# Patient Record
Sex: FEMALE | Race: WHITE | Employment: OTHER | URBAN - METROPOLITAN AREA
[De-identification: names, ages, dates, MRNs, and addresses within clinical notes are randomized per-mention and may not be internally consistent; named-entity substitution may affect disease eponyms.]

---

## 2017-08-25 ENCOUNTER — OP HISTORICAL/CONVERTED ENCOUNTER (OUTPATIENT)
Dept: OTHER | Age: 62
End: 2017-08-25

## 2017-09-20 ENCOUNTER — OP HISTORICAL/CONVERTED ENCOUNTER (OUTPATIENT)
Dept: OTHER | Age: 62
End: 2017-09-20

## 2018-11-16 ENCOUNTER — OP HISTORICAL/CONVERTED ENCOUNTER (OUTPATIENT)
Dept: OTHER | Age: 63
End: 2018-11-16

## 2019-05-18 ENCOUNTER — ED HISTORICAL/CONVERTED ENCOUNTER (OUTPATIENT)
Dept: OTHER | Age: 64
End: 2019-05-18

## 2019-06-06 ENCOUNTER — IP HISTORICAL/CONVERTED ENCOUNTER (OUTPATIENT)
Dept: OTHER | Age: 64
End: 2019-06-06

## 2019-06-13 ENCOUNTER — ED HISTORICAL/CONVERTED ENCOUNTER (OUTPATIENT)
Dept: OTHER | Age: 64
End: 2019-06-13

## 2022-04-01 ENCOUNTER — APPOINTMENT (OUTPATIENT)
Dept: MRI IMAGING | Age: 67
DRG: 069 | End: 2022-04-01
Attending: EMERGENCY MEDICINE
Payer: MEDICARE

## 2022-04-01 ENCOUNTER — APPOINTMENT (OUTPATIENT)
Dept: GENERAL RADIOLOGY | Age: 67
DRG: 069 | End: 2022-04-01
Attending: NURSE PRACTITIONER
Payer: MEDICARE

## 2022-04-01 ENCOUNTER — HOSPITAL ENCOUNTER (INPATIENT)
Age: 67
LOS: 3 days | Discharge: HOME | DRG: 069 | End: 2022-04-04
Attending: EMERGENCY MEDICINE | Admitting: INTERNAL MEDICINE
Payer: MEDICARE

## 2022-04-01 ENCOUNTER — APPOINTMENT (OUTPATIENT)
Dept: CT IMAGING | Age: 67
DRG: 069 | End: 2022-04-01
Attending: EMERGENCY MEDICINE
Payer: MEDICARE

## 2022-04-01 ENCOUNTER — HOSPITAL ENCOUNTER (EMERGENCY)
Age: 67
Discharge: STILL ADMITTED OR ADMINISTRATIVE | End: 2022-04-01

## 2022-04-01 DIAGNOSIS — R53.1 LEFT-SIDED WEAKNESS: ICD-10-CM

## 2022-04-01 DIAGNOSIS — G45.9 TIA (TRANSIENT ISCHEMIC ATTACK): ICD-10-CM

## 2022-04-01 DIAGNOSIS — R47.9 SPEECH DISTURBANCE, UNSPECIFIED TYPE: Primary | ICD-10-CM

## 2022-04-01 PROBLEM — I63.9 CVA (CEREBROVASCULAR ACCIDENT) (HCC): Status: ACTIVE | Noted: 2022-04-01

## 2022-04-01 LAB
ALBUMIN SERPL-MCNC: 3.5 G/DL (ref 3.5–5)
ALBUMIN/GLOB SERPL: 1.1 {RATIO} (ref 1.1–2.2)
ALP SERPL-CCNC: 130 U/L (ref 45–117)
ALT SERPL-CCNC: 25 U/L (ref 12–78)
ANION GAP SERPL CALC-SCNC: 4 MMOL/L (ref 5–15)
APTT PPP: 32.3 SEC (ref 21.2–34.1)
AST SERPL W P-5'-P-CCNC: 13 U/L (ref 15–37)
BASOPHILS # BLD: 0 K/UL (ref 0–0.1)
BASOPHILS NFR BLD: 0 % (ref 0–1)
BILIRUB SERPL-MCNC: 0.3 MG/DL (ref 0.2–1)
BNP SERPL-MCNC: 873 PG/ML
BUN SERPL-MCNC: 16 MG/DL (ref 6–20)
BUN/CREAT SERPL: 10 (ref 12–20)
CA-I BLD-MCNC: 9 MG/DL (ref 8.5–10.1)
CHLORIDE SERPL-SCNC: 99 MMOL/L (ref 97–108)
CO2 SERPL-SCNC: 31 MMOL/L (ref 21–32)
CREAT SERPL-MCNC: 1.68 MG/DL (ref 0.55–1.02)
DIFFERENTIAL METHOD BLD: ABNORMAL
EOSINOPHIL # BLD: 0.6 K/UL (ref 0–0.4)
EOSINOPHIL NFR BLD: 5 % (ref 0–7)
ERYTHROCYTE [DISTWIDTH] IN BLOOD BY AUTOMATED COUNT: 14.2 % (ref 11.5–14.5)
GLOBULIN SER CALC-MCNC: 3.1 G/DL (ref 2–4)
GLUCOSE BLD STRIP.AUTO-MCNC: 128 MG/DL (ref 65–117)
GLUCOSE SERPL-MCNC: 97 MG/DL (ref 65–100)
HCT VFR BLD AUTO: 36.3 % (ref 35–47)
HGB BLD-MCNC: 11.1 G/DL (ref 11.5–16)
IMM GRANULOCYTES # BLD AUTO: 0.1 K/UL (ref 0–0.04)
IMM GRANULOCYTES NFR BLD AUTO: 0 % (ref 0–0.5)
INR PPP: 1 (ref 0.9–1.1)
LITHIUM SERPL-SCNC: 1.1 MMOL/L (ref 0.6–1.2)
LYMPHOCYTES # BLD: 0.8 K/UL (ref 0.8–3.5)
LYMPHOCYTES NFR BLD: 6 % (ref 12–49)
MCH RBC QN AUTO: 29.8 PG (ref 26–34)
MCHC RBC AUTO-ENTMCNC: 30.6 G/DL (ref 30–36.5)
MCV RBC AUTO: 97.3 FL (ref 80–99)
MONOCYTES # BLD: 0.8 K/UL (ref 0–1)
MONOCYTES NFR BLD: 6 % (ref 5–13)
NEUTS SEG # BLD: 9.6 K/UL (ref 1.8–8)
NEUTS SEG NFR BLD: 83 % (ref 32–75)
NRBC # BLD: 0 K/UL (ref 0–0.01)
NRBC BLD-RTO: 0 PER 100 WBC
PERFORMED BY, TECHID: ABNORMAL
PLATELET # BLD AUTO: 199 K/UL (ref 150–400)
PMV BLD AUTO: 9.8 FL (ref 8.9–12.9)
POTASSIUM SERPL-SCNC: 3.8 MMOL/L (ref 3.5–5.1)
PROT SERPL-MCNC: 6.6 G/DL (ref 6.4–8.2)
PROTHROMBIN TIME: 13.3 SEC (ref 11.9–14.6)
RBC # BLD AUTO: 3.73 M/UL (ref 3.8–5.2)
SODIUM SERPL-SCNC: 134 MMOL/L (ref 136–145)
THERAPEUTIC RANGE,PTTT: NORMAL SEC (ref 82–109)
TROPONIN-HIGH SENSITIVITY: 132 NG/L (ref 0–51)
TROPONIN-HIGH SENSITIVITY: 153 NG/L (ref 0–51)
TROPONIN-HIGH SENSITIVITY: 165 NG/L (ref 0–51)
WBC # BLD AUTO: 11.8 K/UL (ref 3.6–11)

## 2022-04-01 PROCEDURE — 4A03X5D MEASUREMENT OF ARTERIAL FLOW, INTRACRANIAL, EXTERNAL APPROACH: ICD-10-PCS | Performed by: STUDENT IN AN ORGANIZED HEALTH CARE EDUCATION/TRAINING PROGRAM

## 2022-04-01 PROCEDURE — 70450 CT HEAD/BRAIN W/O DYE: CPT

## 2022-04-01 PROCEDURE — 85610 PROTHROMBIN TIME: CPT

## 2022-04-01 PROCEDURE — 70496 CT ANGIOGRAPHY HEAD: CPT

## 2022-04-01 PROCEDURE — 93005 ELECTROCARDIOGRAM TRACING: CPT

## 2022-04-01 PROCEDURE — 80053 COMPREHEN METABOLIC PANEL: CPT

## 2022-04-01 PROCEDURE — 84484 ASSAY OF TROPONIN QUANT: CPT

## 2022-04-01 PROCEDURE — 92610 EVALUATE SWALLOWING FUNCTION: CPT

## 2022-04-01 PROCEDURE — 74011250637 HC RX REV CODE- 250/637: Performed by: INTERNAL MEDICINE

## 2022-04-01 PROCEDURE — 36415 COLL VENOUS BLD VENIPUNCTURE: CPT

## 2022-04-01 PROCEDURE — 82962 GLUCOSE BLOOD TEST: CPT

## 2022-04-01 PROCEDURE — 74011250636 HC RX REV CODE- 250/636: Performed by: INTERNAL MEDICINE

## 2022-04-01 PROCEDURE — 83880 ASSAY OF NATRIURETIC PEPTIDE: CPT

## 2022-04-01 PROCEDURE — 99285 EMERGENCY DEPT VISIT HI MDM: CPT

## 2022-04-01 PROCEDURE — 80178 ASSAY OF LITHIUM: CPT

## 2022-04-01 PROCEDURE — 74011000636 HC RX REV CODE- 636: Performed by: EMERGENCY MEDICINE

## 2022-04-01 PROCEDURE — 71045 X-RAY EXAM CHEST 1 VIEW: CPT

## 2022-04-01 PROCEDURE — 65270000029 HC RM PRIVATE

## 2022-04-01 PROCEDURE — 85025 COMPLETE CBC W/AUTO DIFF WBC: CPT

## 2022-04-01 PROCEDURE — 85730 THROMBOPLASTIN TIME PARTIAL: CPT

## 2022-04-01 RX ORDER — GUAIFENESIN 100 MG/5ML
81 LIQUID (ML) ORAL DAILY
Status: DISCONTINUED | OUTPATIENT
Start: 2022-04-02 | End: 2022-04-04 | Stop reason: HOSPADM

## 2022-04-01 RX ORDER — ATORVASTATIN CALCIUM 40 MG/1
40 TABLET, FILM COATED ORAL
Status: DISCONTINUED | OUTPATIENT
Start: 2022-04-01 | End: 2022-04-04 | Stop reason: HOSPADM

## 2022-04-01 RX ORDER — SODIUM CHLORIDE 9 MG/ML
100 INJECTION, SOLUTION INTRAVENOUS CONTINUOUS
Status: DISCONTINUED | OUTPATIENT
Start: 2022-04-01 | End: 2022-04-03

## 2022-04-01 RX ORDER — ACETAMINOPHEN 325 MG/1
650 TABLET ORAL
Status: DISCONTINUED | OUTPATIENT
Start: 2022-04-01 | End: 2022-04-04 | Stop reason: HOSPADM

## 2022-04-01 RX ORDER — LOSARTAN POTASSIUM 50 MG/1
50 TABLET ORAL DAILY
COMMUNITY
Start: 2022-01-31

## 2022-04-01 RX ORDER — ACETAMINOPHEN 650 MG/1
650 SUPPOSITORY RECTAL
Status: DISCONTINUED | OUTPATIENT
Start: 2022-04-01 | End: 2022-04-04 | Stop reason: HOSPADM

## 2022-04-01 RX ORDER — LITHIUM CARBONATE 300 MG/1
300 CAPSULE ORAL 2 TIMES DAILY
COMMUNITY
Start: 2022-03-24

## 2022-04-01 RX ORDER — ENOXAPARIN SODIUM 100 MG/ML
40 INJECTION SUBCUTANEOUS EVERY 24 HOURS
Status: DISCONTINUED | OUTPATIENT
Start: 2022-04-01 | End: 2022-04-04 | Stop reason: HOSPADM

## 2022-04-01 RX ADMIN — IOPAMIDOL 100 ML: 755 INJECTION, SOLUTION INTRAVENOUS at 13:35

## 2022-04-01 RX ADMIN — ATORVASTATIN CALCIUM 40 MG: 40 TABLET, FILM COATED ORAL at 20:53

## 2022-04-01 RX ADMIN — SODIUM CHLORIDE 75 ML/HR: 9 INJECTION, SOLUTION INTRAVENOUS at 17:56

## 2022-04-01 RX ADMIN — ACETAMINOPHEN 650 MG: 325 TABLET ORAL at 21:21

## 2022-04-01 NOTE — ED PROVIDER NOTES
EMERGENCY DEPARTMENT HISTORY AND PHYSICAL EXAM      Date: 4/1/2022  Patient Name: Daniela Luna    History of Presenting Illness     Chief Complaint   Patient presents with    Dysarthria       History Provided By: Patient    HPI: Daniela Luna, 73 y.o. female with a past medical history significant No significant past medical history presents to the ED with cc of speech problem which started prior to arrival.  Patient also reports left upper and lower extremity weakness over the last 2 hours. Patient denies fevers or chills, denies nausea or vomiting. Patient is a poor historian, history otherwise limited, though she denies any problem with speech previously. There are no other complaints, changes, or physical findings at this time. PCP: Lynn Keith MD    No current facility-administered medications on file prior to encounter. Current Outpatient Medications on File Prior to Encounter   Medication Sig Dispense Refill    losartan (COZAAR) 50 mg tablet Take 50 mg by mouth daily.  lithium carbonate 300 mg capsule Take 300 mg by mouth two (2) times a day. Past History     Past Medical History:  Past Medical History:   Diagnosis Date    [facility]     CVA [facility]cerebral vascular accident) ([facility] [address]     HTN (hypertension)        Past Surgical History:  History reviewed. No pertinent surgical history. Family History:  History reviewed. No pertinent family history. Social History:  Social History     Tobacco Use    Smoking status: Current Every Day Smoker    Smokeless tobacco: Never Used   Substance Use Topics    Alcohol use: Never    Drug use: Never       Allergies: Allergies   Allergen Reactions    Penicillins Unknown (comments)         Review of Systems   Unable to obtain complete review systems due to patient's medical condition  Review of Systems    Physical Exam   Physical Exam  Constitutional:       General: No acute distress. Appearance: Normal appearance. Not toxic-appearing. HENT:      Head: Normocephalic and atraumatic. Nose: Nose normal.      Mouth/Throat:      Mouth: Mucous membranes are moist.   Eyes:      Extraocular Movements: Extraocular movements intact. Pupils: Pupils are equal, round, and reactive to light. Cardiovascular:      Rate and Rhythm: Normal rate. Pulses: Normal pulses. Pulmonary:      Effort: Pulmonary effort is normal.      Breath sounds: No stridor. Abdominal:      General: Abdomen is flat. There is no distension. Musculoskeletal:         General: Normal range of motion. Cervical back: Normal range of motion and neck supple. Skin:     General: Skin is warm and dry. Capillary Refill: Capillary refill takes less than 2 seconds. Neurological:      General: Left upper and lower extremity weakness noted. Sluggish speech noted     Mental Status: Alert and oriented to person, place, and time. Psychiatric:         Mood and Affect: Mood normal.         Behavior: Behavior normal.       Physical Exam    Lab and Diagnostic Study Results     Labs -     Recent Results (from the past 12 hour(s))   GLUCOSE, POC    Collection Time: 04/01/22  1:14 PM   Result Value Ref Range    Glucose (POC) 128 (H) 65 - 117 mg/dL    Performed by [facility]    CBC WITH AUTOMATED DIFF    Collection Time: 04/01/22  1:49 PM   Result Value Ref Range    WBC 11.8 (H) 3.6 - 11.0 K/uL    RBC 3.73 (L) 3.80 - 5.20 M/uL    HGB 11.1 (L) 11.5 - 16.0 g/dL    HCT 36.3 35.0 - 47.0 %    MCV 97.3 80.0 - 99.0 FL    MCH 29.8 26.0 - 34.0 PG    MCHC 30.6 30.0 - 36.5 g/dL    RDW 14.2 11.5 - 14.5 %    PLATELET 944 753 - 007 K/uL    MPV 9.8 8.9 - 12.9 FL    NRBC 0.0 0.0  WBC    ABSOLUTE NRBC 0.00 0.00 - 0.01 K/uL    NEUTROPHILS 83 (H) 32 - 75 %    LYMPHOCYTES 6 (L) 12 - 49 %    MONOCYTES 6 5 - 13 %    EOSINOPHILS 5 0 - 7 %    BASOPHILS 0 0 - 1 %    IMMATURE GRANULOCYTES 0 0 - 0.5 %    ABS. NEUTROPHILS 9.6 (H) 1.8 - 8.0 K/UL    ABS.  LYMPHOCYTES 0.8 0.8 - 3.5 K/UL    ABS. MONOCYTES 0.8 0.0 - 1.0 K/UL    ABS. EOSINOPHILS 0.6 (H) 0.0 - 0.4 K/UL    ABS. BASOPHILS 0.0 0.0 - 0.1 K/UL    ABS. IMM. GRANS. 0.1 (H) 0.00 - 0.04 K/UL    DF AUTOMATED     METABOLIC PANEL, COMPREHENSIVE    Collection Time: 04/01/22  1:49 PM   Result Value Ref Range    Sodium 134 (L) 136 - 145 mmol/L    Potassium 3.8 3.5 - 5.1 mmol/L    Chloride 99 97 - 108 mmol/L    CO2 31 21 - 32 mmol/L    Anion gap 4 (L) 5 - 15 mmol/L    Glucose 97 65 - 100 mg/dL    BUN 16 6 - 20 mg/dL    Creatinine 1.68 (H) 0.55 - 1.02 mg/dL    BUN/Creatinine ratio 10 (L) 12 - 20      GFR est AA 37 (L) >60 ml/min/1.73m2    GFR est non-AA 31 (L) >60 ml/min/1.73m2    Calcium 9.0 8.5 - 10.1 mg/dL    Bilirubin, total 0.3 0.2 - 1.0 mg/dL    AST (SGOT) 13 (L) 15 - 37 U/L    ALT (SGPT) 25 12 - 78 U/L    Alk. phosphatase 130 (H) 45 - 117 U/L    Protein, total 6.6 6.4 - 8.2 g/dL    Albumin 3.5 3.5 - 5.0 g/dL    Globulin 3.1 2.0 - 4.0 g/dL    A-G Ratio 1.1 1.1 - 2.2     PROTHROMBIN TIME + INR    Collection Time: 04/01/22  1:49 PM   Result Value Ref Range    Prothrombin time 13.3 11.9 - 14.6 sec    INR 1.0 0.9 - 1.1     PTT    Collection Time: 04/01/22  1:49 PM   Result Value Ref Range    aPTT 32.3 21.2 - 34.1 sec    aPTT, therapeutic range   82 - 109 sec   TROPONIN-HIGH SENSITIVITY    Collection Time: 04/01/22  1:49 PM   Result Value Ref Range    Troponin-High Sensitivity 165 (HH) 0 - 51 ng/L   LITHIUM    Collection Time: 04/01/22  1:49 PM   Result Value Ref Range    Lithium level 1.10 0.60 - 1.20 mmol/L       Radiologic Studies -   @[email or web address]@  CT Results  (Last 48 hours)               04/01/22 1334  CTA CODE NEURO HEAD AND NECK W CONT Final result    Impression:      1. No large vessel occlusion. Intracranial atherosclerosis without high-grade   intracranial stenosis. 2. No hemodynamically significant stenosis or cervical carotid or vertebral   arteries. All measurements are based on NASCET-like criteria.         The results were discussed with/ relayed to Pedro Wharton RN Charge at the   completion of the performance of the examination. Narrative:  [facility] - CODE NEURO CTA HEAD AND NECK:       HISTORY: Left upper extremity and left lower extremity weakness       COMPARISON: CT head, 4/1/2022. Technique: Axial images were obtained through the brain and neck following IV   administration of contrast. CT angiography was performed of the head and neck   following additional bolus administration of contrast. Images of the head and   neck were separately reformatted in coronal, sagittal and axial planes. In   addition, this institution utilizes the [employer] processing and   evaluation for acute stroke imaging and communication. CTA imaging analyzed by [employer] LVO ContaCT to enable computer-assisted triage   notification to rapidly detect a LVO and shorten time to notification via secure   communication to neurology and ED. All CT scans at this facility are performed using dose reduction optimization   techniques as appropriate to a performed exam including the following: Automated   exposure control, adjustments of the mA and/or kV according to patient size, or   use of iterative reconstruction technique. Contrast: Isovue-370 100 cc       Please note that this CT evaluation is optimized for examination of the vascular   structures and minor/chronic degenerative, bony, or other findings that do not   impact the intended evaluation and management of this patient will not be   included in this report.        **All stenosis measurements are based on NASCET-like criteria*       CTA HEAD AND NECK:   Technique: Axial images were obtained through the brain and neck following IV   administration of contrast. CT perfusion imaging was initially obtained and CT   angiography was performed of the head and neck following additional bolus   administration of contrast. Images of the head and neck were separately reformatted in coronal, sagittal and axial planes. In addition, MIP images of   the head and neck  vessels were separately reconstructed at an independent CT   work station. Vessel analysis was also performed when required. CTA Head:   Petrous ICAs are widely patent. Calcification along the course of the cavernous   carotid and supraclinoid ICAs without flow-limiting stenosis. Ophthalmic   arteries are visualized bilaterally. Mild irregularity along the course of the anterior, middle, and posterior   cerebral arteries without flow-limiting stenosis. Right intradural vertebral artery largely terminates as PICA. Left intradural   vertebral, vertebrobasilar junction, basilar artery are patent without   flow-limiting stenosis. No CT identifiable aneurysm. Major dural venous sinuses are patent. CTA Neck:   Three-vessel origin of the aortic arch. Great vessel origins are widely patent. Visualized subclavian arteries are widely patent. Foci of calcifications along   the course of both common carotid arteries without flow-limiting stenosis. ECA   origins are patent. Calcific atheromatous disease of the carotid bulbs without hemodynamically   significant stenosis by NASCET criteria (<50%). Mid and distal cervical ICAs are   patent without flow-limiting stenosis. Left cervical vertebral artery is dominant. Both cervical vertebral arteries are   patent from origins level of skull base without flow-limiting stenosis. No dissection or pseudoaneurysm. Other Findings:   - The patient is nearly edentulous with a few remaining teeth. - Multilevel cervical spondylosis including posterior disc abnormalities,   uncovertebral, and facet hypertrophy, contributing to varying degrees of spinal   canal or neural foraminal narrowing. 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   - Visualized lungs are clear.            04/01/22 [facility] CONTRAST Final result    Impression:      Chronic findings. I called the report to Dr. Poncho Somers 4/1/2022 1:32 PM.       Narrative: Indication: Stroke. Dose reduction: All CT scans done at this facility are performed using dose   reduction optimization techniques as appropriate to a performed exam including   the following: Automated exposure control, adjustments of the mA and/or kV   according to patient size, or use of iterative reconstruction technique. CT head unenhanced 4/1/2022. No comparison. No intracranial hemorrhage. No apparent cortical infarct. Normal ventricles. Atrophy of bilateral frontal and parietal lobes. Normal mastoids, tympanic cavities. CXR Results  (Last 48 hours)    None            Medical Decision Making   - I am the first provider for this patient. - I reviewed the vital signs, available nursing notes, past medical history, past surgical history, family history and social history. - Initial assessment performed. The patients presenting problems have been discussed, and they are in agreement with the care plan formulated and outlined with them. I have encouraged them to ask questions as they arise throughout their visit. Vital Signs-Reviewed the patient's vital signs. Patient Vitals for the past 12 hrs:   Temp Pulse Resp BP SpO2   04/01/22 1427 -- 85 20 (!) 168/81 94 %   04/01/22 1412 -- 85 24 (!) 164/79 96 %   04/01/22 1400 -- 90 18 (!) 165/60 96 %   04/01/22 1344 -- 94 16 (!) 168/61 96 %   04/01/22 1313 98 °F (36.7 °C) 98 16 (!) 158/78 99 %       Records Reviewed: Nursing Notes and Old Medical Records      ED Course:     ED Course as of 04/01/22 1511   Fri Apr 01, 2022   1342 Patient symptoms have improved, strength in the left extremities 4 out of 5 upper and lower. Speech seems to have resolved as well.  [CS]      ED Course User Index  [CS] Ivory Murphy MD       Provider Notes (Medical Decision Making):   Patient with mildly sluggish speech, but clear and coherent. No facial neurologic deficits noted. Patient also reports and demonstrates left-sided weakness, though question I have regarding her cooperation. Speech symptoms and left-sided weakness of unclear etiology, will have neurology consult. MDM       Procedures   Medical Decision Makingedical Decision Making  Performed by: Coy Mcgill MD  PROCEDURES:  Critical Care  Performed by: Sari Hitchcock MD  Authorized by: Sari Hitchcock MD     Critical care provider statement:     Critical care time (minutes):  35    Critical care time was exclusive of:  Separately billable procedures and treating other patients and teaching time    Critical care was necessary to treat or prevent imminent or life-threatening deterioration of the following conditions:  CNS failure or compromise    Critical care was time spent personally by me on the following activities:  Development of treatment plan with patient or surrogate, discussions with consultants, evaluation of patient's response to treatment, examination of patient, obtaining history from patient or surrogate, re-evaluation of patient's condition, ordering and review of radiographic studies, ordering and review of laboratory studies and ordering and performing treatments and interventions    I assumed direction of critical care for this patient from another provider in my specialty: no             Disposition   Disposition: Admitted to Floor Medical Floor the case was discussed with the admitting physician         Diagnosis     Clinical Impression:   1. Speech disturbance, unspecified type    2. Left-sided weakness    3. TIA (transient ischemic attack)        Attestations:    Coy Mcgill MD    Please note that this dictation was completed with [employer], the "[employer] voice recognition software.   Quite often unanticipated grammatical, syntax, homophones, and other interpretive errors are inadvertently transcribed by the computer software. Please disregard these errors. Please excuse any errors that have escaped final proofreading. Thank you.

## 2022-04-01 NOTE — ACP (ADVANCE CARE PLANNING)
Advance Care Planning   Healthcare Decision Maker:       Primary Decision Maker: Chilango Tee - [phone]

## 2022-04-01 NOTE — H&P
History & Physical    Primary Care Provider: Rasheeda Brooks MD  Source of Information: Patient/family     History of Presenting Illness:   Piyush Webb is a 73 y.o. female with a PMH of chronic pain syndrome, 2 back fusions, a mitral valve replacement, hip replacement, COPD, emphysema, HTN, and Hx of 3 strokes (last 16 years ago) presents to the ED with slurred speech, left sided weakness, and inability to write. Patient states that she was at work doing paperwork when she felt left arm weakness and agraphia. She states she could not articulate any words at the time. She states that her stroke 16 years ago presented with similar symptoms. Patient did not have slurred speech or weakness upon examination. She said that she had pneumonia 2 weeks ago and has been on antibiotics and a steroid since. Patient has smoked 1 ppd for 57 years. She denies any alcohol use. 4/1 troponin (165), creatinine (1.68). No previous creatinine available    CTA head showed no large vessel occlusion. Intracranial atherosclerosis without high grade intracranial stenosis. At the very end of my interview with patient she mentioned as an aside that she has been having some intermittent chest tightness the past several days.   She never mentioned this to anybody else because she did not think it meant anything    Reports she was told not to take aspirin after her previous stroke because it was a bleed       Review of Systems:  Constitutional: negative  Respiratory: positive for wheezing  Cardiovascular: negative  Gastrointestinal: negative  Genitourinary:negative  Musculoskeletal:negative  Neurological: positive for speech problems, coordination problems, gait problems and weakness     Past Medical History:   Diagnosis Date    Bipolar 1 disorder ([address]     Chronic obstructive pulmonary disease ([address]     Chronic pain     TENS unit in back    CVA (cerebral vascular accident) ([address]     x 3    HTN (hypertension)         Past Surgical History:   Procedure Laterality Date    HX HIP REPLACEMENT      HX MITRAL VALVE REPLACEMENT  2009    bioprosthetic       Prior to Admission medications    Medication Sig Start Date End Date Taking? Authorizing Provider   losartan (COZAAR) 50 mg tablet Take 50 mg by mouth daily. 1/31/22  Yes Other, MD Rajinder   lithium carbonate 300 mg capsule Take 300 mg by mouth two (2) times a day. 3/24/22  Yes Other, MD Rajinder       Allergies   Allergen Reactions    Penicillins Unknown (comments)        Family History   Problem Relation Age of Onset    Heart Disease Mother     Lung Disease Father         Social History     Socioeconomic History    Marital status:    Tobacco Use    Smoking status: Current Every Day Smoker     Packs/day: 2.00     Years: 57.00     Pack years: 114.00    Smokeless tobacco: Never Used   Substance and Sexual Activity    Alcohol use: Never    Drug use: Never            CODE STATUS:  DNR    Full    Other      Objective:     Physical Exam:     Visit Vitals  BP (!) 160/61   Pulse 86   Temp 98 °F (36.7 °C)   Resp 18   Ht 5' 7\" (1.702 m)   Wt 68 kg (150 lb)   SpO2 96%   BMI 23.49 kg/m²      O2 Device: None (Room air)    General:  Alert, cooperative, no distress, appears stated age. Head:  Normocephalic, without obvious abnormality, atraumatic. Eyes:  Conjunctivae/corneas clear. PERRL, EOMs intact. Nose: Nares normal. Septum midline. Mucosa normal. No drainage or sinus tenderness. Throat: Lips, mucosa, and tongue normal. Teeth and gums normal.   Neck: Supple, symmetrical, trachea midline, no adenopathy, thyroid: no enlargement/tenderness/nodules, no carotid bruit and no JVD. Back:   Symmetric, no curvature. ROM normal. No CVA tenderness. Lungs:    Wheezes bilaterally   Chest wall:  No tenderness or deformity. Heart:  Regular rate and rhythm, S1, S2 normal, no murmur, click, rub or gallop. Abdomen:   Soft, non-tender.  Bowel sounds normal. No masses,  No organomegaly. Extremities: Extremities normal, atraumatic, no cyanosis or edema. Pulses: 2+ and symmetric all extremities. Skin: Skin color, texture, turgor normal. No rashes or lesions   Neurologic: CNII-XII intact. No motor or sensory deficits. 24 Hour Results:    Recent Results (from the past 24 hour(s))   GLUCOSE, POC    Collection Time: 04/01/22  1:14 PM   Result Value Ref Range    Glucose (POC) 128 (H) 65 - 117 mg/dL    Performed by [facility]    CBC WITH AUTOMATED DIFF    Collection Time: 04/01/22  1:49 PM   Result Value Ref Range    WBC 11.8 (H) 3.6 - 11.0 K/uL    RBC 3.73 (L) 3.80 - 5.20 M/uL    HGB 11.1 (L) 11.5 - 16.0 g/dL    HCT 36.3 35.0 - 47.0 %    MCV 97.3 80.0 - 99.0 FL    MCH 29.8 26.0 - 34.0 PG    MCHC 30.6 30.0 - 36.5 g/dL    RDW 14.2 11.5 - 14.5 %    PLATELET 522 925 - 708 K/uL    MPV 9.8 8.9 - 12.9 FL    NRBC 0.0 0.0  WBC    ABSOLUTE NRBC 0.00 0.00 - 0.01 K/uL    NEUTROPHILS 83 (H) 32 - 75 %    LYMPHOCYTES 6 (L) 12 - 49 %    MONOCYTES 6 5 - 13 %    EOSINOPHILS 5 0 - 7 %    BASOPHILS 0 0 - 1 %    IMMATURE GRANULOCYTES 0 0 - 0.5 %    ABS. NEUTROPHILS 9.6 (H) 1.8 - 8.0 K/UL    ABS. LYMPHOCYTES 0.8 0.8 - 3.5 K/UL    ABS. MONOCYTES 0.8 0.0 - 1.0 K/UL    ABS. EOSINOPHILS 0.6 (H) 0.0 - 0.4 K/UL    ABS. BASOPHILS 0.0 0.0 - 0.1 K/UL    ABS. IMM.  GRANS. 0.1 (H) 0.00 - 0.04 K/UL    DF AUTOMATED     METABOLIC PANEL, COMPREHENSIVE    Collection Time: 04/01/22  1:49 PM   Result Value Ref Range    Sodium 134 (L) 136 - 145 mmol/L    Potassium 3.8 3.5 - 5.1 mmol/L    Chloride 99 97 - 108 mmol/L    CO2 31 21 - 32 mmol/L    Anion gap 4 (L) 5 - 15 mmol/L    Glucose 97 65 - 100 mg/dL    BUN 16 6 - 20 mg/dL    Creatinine 1.68 (H) 0.55 - 1.02 mg/dL    BUN/Creatinine ratio 10 (L) 12 - 20      GFR est AA 37 (L) >60 ml/min/1.73m2    GFR est non-AA 31 (L) >60 ml/min/1.73m2    Calcium 9.0 8.5 - 10.1 mg/dL    Bilirubin, total 0.3 0.2 - 1.0 mg/dL    AST (SGOT) 13 (L) 15 - 37 U/L    ALT (SGPT) 25 12 - 78 U/L    Alk. phosphatase 130 (H) 45 - 117 U/L    Protein, total 6.6 6.4 - 8.2 g/dL    Albumin 3.5 3.5 - 5.0 g/dL    Globulin 3.1 2.0 - 4.0 g/dL    A-G Ratio 1.1 1.1 - 2.2     PROTHROMBIN TIME + INR    Collection Time: 04/01/22  1:49 PM   Result Value Ref Range    Prothrombin time 13.3 11.9 - 14.6 sec    INR 1.0 0.9 - 1.1     PTT    Collection Time: 04/01/22  1:49 PM   Result Value Ref Range    aPTT 32.3 21.2 - 34.1 sec    aPTT, therapeutic range   82 - 109 sec   TROPONIN-HIGH SENSITIVITY    Collection Time: 04/01/22  1:49 PM   Result Value Ref Range    Troponin-High Sensitivity 165 (HH) 0 - 51 ng/L   LITHIUM    Collection Time: 04/01/22  1:49 PM   Result Value Ref Range    Lithium level 1.10 0.60 - 1.20 mmol/L         Imaging:   CTA CODE NEURO HEAD AND NECK W CONT   Final Result      1. No large vessel occlusion. Intracranial atherosclerosis without high-grade   intracranial stenosis. 2. No hemodynamically significant stenosis or cervical carotid or vertebral   arteries. All measurements are based on NASCET-like criteria. The results were discussed with/ relayed to Rekha Awad RN Charge at the   completion of the performance of the examination. CT CODE NEURO HEAD WO CONTRAST   Final Result      Chronic findings. I called the report to Dr. Maren Fry 4/1/2022 1:32 PM.      DUPLEX CAROTID BILATERAL    (Results Pending)      EKG is normal       Assessment:     TIA with transient left hemiplegia and speech difficulty, now resolved    Chest pain with elevated troponin, concerning for acute coronary syndrome    Hx of Stroke x3- last was 2006    Chronic Pain Syndrome- hasn't been on opioids for 3 years.   Has TENS unit in place    History of mitral Valve Replacement due to Rheumatic Fever 2009    Hip Replacement 2010    COPD    Tobacco abuse    Essential HTN    Hx of Pneumonia 2 weeks ago- took antibiotics and steroids      Plan:     Admit to cardiac telemetry  Trend troponins  Get Echo  Get Carotid Duplex  Pt/Ot  Consult cardiology  Monitor renal function   Give IV fluids  Atorvastatin 40mg PO 1x daily  Enoxaparin 40mg Injection  Asprin 81mg  Home medications were reviewed    Smoking cessation strongly advised    Full CODE STATUS    Signed By: Carlos Hamilton MD     April 1, 2022

## 2022-04-01 NOTE — CONSULTS
CONSULTATION    REASON FOR CONSULT:  Chest pain    REQUESTING PROVIDER:  Dr. Eliezer Go:    Chief Complaint   Patient presents with    Dysarthria         HISTORY OF PRESENT ILLNESS:  Mary Huang is a 73y.o. year-old female with past medical history significant for hypertension, rheumatic heart disease with mitral valve replacement, congestive heart failure, chronic obstructive pulmonary disease, emphysema, nicotine dependence, obstructive sleep apnea ( does not wear CPAP), CVA x 3,  and chronic back pain (stimulator implant) who was admitted to the hospital for further evaluation of slurred speech and left sided weakness. On examination, patient is awake and alert. She is answering questions appropriately. Patient does have a history of mitral valve replacement ( porcine valve), however patient has not followed with cardiology in the outpatient setting. She is also complaining of orthopnea and audible wheezing at night time, unable to lie flat and has not slept well. Reports intermittent chest pain for the past few days. No worsening or alleviating symptoms. Recently treated in the outpatient setting for pneumonia. Patient is accompanied by her  and grandson. Significant labs include: HS troponin 165, EKG: normal sinus rhythm; no ischemia     Records from hospital admission course thus far reviewed.        PAST MEDICAL HISTORY:    Past Medical History:   Diagnosis Date    Bipolar 1 disorder ([address]     Chronic obstructive pulmonary disease (HCC)     Chronic pain     TENS unit in back    CVA (cerebral vascular accident) ([address]     x 3    HTN (hypertension)        PAST SURGICAL HISTORY:   Past Surgical History:   Procedure Laterality Date    HX HIP REPLACEMENT      HX MITRAL VALVE REPLACEMENT  2009    bioprosthetic       ALLERGIES:    Allergies   Allergen Reactions    Penicillins Unknown (comments)       FAMILY HISTORY:    Family History   Problem Relation Age of Onset    Heart Disease Mother     Lung Disease Father        SOCIAL HISTORY:    Tobacco: current everyday smoker 50 + pack years  Drugs: no  ETOH: no     HOME MEDICATIONS:    Prior to Admission Medications   Prescriptions Last Dose Informant Patient Reported? Taking?   lithium carbonate 300 mg capsule   Yes Yes   Sig: Take 300 mg by mouth two (2) times a day. losartan (COZAAR) 50 mg tablet   Yes Yes   Sig: Take 50 mg by mouth daily. Facility-Administered Medications: None       REVIEW OF SYSTEMS:  Complete review of systems performed, pertinents noted above, all other systems are negative. Patient Vitals for the past 24 hrs:   Temp Pulse Resp BP SpO2   04/01/22 1535 98 °F (36.7 °C) 86 18 (!) 160/61 96 %   04/01/22 1427 -- 85 20 (!) 168/81 94 %   04/01/22 1412 -- 85 24 (!) 164/79 96 %   04/01/22 1400 -- 90 18 (!) 165/60 96 %   04/01/22 1344 -- 94 16 (!) 168/61 96 %   04/01/22 1313 98 °F (36.7 °C) 98 16 (!) 158/78 99 %       PHYSICAL EXAMINATION:    General:  NAD, A&O  HEENT: Normocephalic, PERRL, no drainage  Neck: Supple, Trachea midline, No JVD  RESP: CTA bilaterally. + Symmetrical chest movement. No SOB or distress. On room air  Cardiovascular: RRR no MRG  PVS: No rubor, cyanosis, no edema  ABD:  soft, NT, Normoactive BS  Derm: Warm/Dry/Intact with no lesions  Neuro: A&O PPTS,  No focal deficits  PSYCH: No anxiety or agitation    Recent labs results and imaging reviewed.       Recent Results (from the past 24 hour(s))   GLUCOSE, POC    Collection Time: 04/01/22  1:14 PM   Result Value Ref Range    Glucose (POC) 128 (H) 65 - 117 mg/dL    Performed by Catrachito Pizarro    CBC WITH AUTOMATED DIFF    Collection Time: 04/01/22  1:49 PM   Result Value Ref Range    WBC 11.8 (H) 3.6 - 11.0 K/uL    RBC 3.73 (L) 3.80 - 5.20 M/uL    HGB 11.1 (L) 11.5 - 16.0 g/dL    HCT 36.3 35.0 - 47.0 %    MCV 97.3 80.0 - 99.0 FL    MCH 29.8 26.0 - 34.0 PG    MCHC 30.6 30.0 - 36.5 g/dL    RDW 14.2 11.5 - 14.5 %    PLATELET 057 111 - 359 K/uL    MPV 9.8 8.9 - 12.9 FL    NRBC 0.0 0.0  WBC    ABSOLUTE NRBC 0.00 0.00 - 0.01 K/uL    NEUTROPHILS 83 (H) 32 - 75 %    LYMPHOCYTES 6 (L) 12 - 49 %    MONOCYTES 6 5 - 13 %    EOSINOPHILS 5 0 - 7 %    BASOPHILS 0 0 - 1 %    IMMATURE GRANULOCYTES 0 0 - 0.5 %    ABS. NEUTROPHILS 9.6 (H) 1.8 - 8.0 K/UL    ABS. LYMPHOCYTES 0.8 0.8 - 3.5 K/UL    ABS. MONOCYTES 0.8 0.0 - 1.0 K/UL    ABS. EOSINOPHILS 0.6 (H) 0.0 - 0.4 K/UL    ABS. BASOPHILS 0.0 0.0 - 0.1 K/UL    ABS. IMM. GRANS. 0.1 (H) 0.00 - 0.04 K/UL    DF AUTOMATED     METABOLIC PANEL, COMPREHENSIVE    Collection Time: 04/01/22  1:49 PM   Result Value Ref Range    Sodium 134 (L) 136 - 145 mmol/L    Potassium 3.8 3.5 - 5.1 mmol/L    Chloride 99 97 - 108 mmol/L    CO2 31 21 - 32 mmol/L    Anion gap 4 (L) 5 - 15 mmol/L    Glucose 97 65 - 100 mg/dL    BUN 16 6 - 20 mg/dL    Creatinine 1.68 (H) 0.55 - 1.02 mg/dL    BUN/Creatinine ratio 10 (L) 12 - 20      GFR est AA 37 (L) >60 ml/min/1.73m2    GFR est non-AA 31 (L) >60 ml/min/1.73m2    Calcium 9.0 8.5 - 10.1 mg/dL    Bilirubin, total 0.3 0.2 - 1.0 mg/dL    AST (SGOT) 13 (L) 15 - 37 U/L    ALT (SGPT) 25 12 - 78 U/L    Alk. phosphatase 130 (H) 45 - 117 U/L    Protein, total 6.6 6.4 - 8.2 g/dL    Albumin 3.5 3.5 - 5.0 g/dL    Globulin 3.1 2.0 - 4.0 g/dL    A-G Ratio 1.1 1.1 - 2.2     PROTHROMBIN TIME + INR    Collection Time: 04/01/22  1:49 PM   Result Value Ref Range    Prothrombin time 13.3 11.9 - 14.6 sec    INR 1.0 0.9 - 1.1     PTT    Collection Time: 04/01/22  1:49 PM   Result Value Ref Range    aPTT 32.3 21.2 - 34.1 sec    aPTT, therapeutic range   82 - 109 sec   TROPONIN-HIGH SENSITIVITY    Collection Time: 04/01/22  1:49 PM   Result Value Ref Range    Troponin-High Sensitivity 165 (HH) 0 - 51 ng/L   LITHIUM    Collection Time: 04/01/22  1:49 PM   Result Value Ref Range    Lithium level 1.10 0.60 - 1.20 mmol/L       XR Results (maximum last 3): No results found for this or any previous visit.       CT Results (maximum last 3): Results from [place] encounter on 04/01/22    CTA CODE NEURO HEAD AND NECK W CONT    Impression  1. No large vessel occlusion. Intracranial atherosclerosis without high-grade  intracranial stenosis. 2. No hemodynamically significant stenosis or cervical carotid or vertebral  arteries. All measurements are based on NASCET-like criteria. The results were discussed with/ relayed to Clyde Ortega RN Charge at the  completion of the performance of the examination. CT CODE NEURO HEAD WO CONTRAST    Impression  Chronic findings. I called the report to Dr. Colleen Knott 4/1/2022 1:32 PM.      Current Facility-Administered Medications:     acetaminophen (TYLENOL) tablet 650 mg, 650 mg, Oral, Q4H PRN **OR** acetaminophen (TYLENOL) solution 650 mg, 650 mg, Per NG tube, Q4H PRN **OR** acetaminophen (TYLENOL) suppository 650 mg, 650 mg, Rectal, Q4H PRN, Nataliia Montemayor MD    enoxaparin (LOVENOX) injection 40 mg, 40 mg, SubCUTAneous, Q24H, Nataliia Murphy MD    [START ON 4/2/2022] aspirin chewable tablet 81 mg, 81 mg, Oral, DAILY, Nataliia Murphy MD    atorvastatin (LIPITOR) tablet 40 mg, 40 mg, Oral, QHS, Nataliia Murphy MD    0.9% sodium chloride infusion, 75 mL/hr, IntraVENous, CONTINUOUS, Nataliia Murphy MD    Current Outpatient Medications:     losartan (COZAAR) 50 mg tablet, Take 50 mg by mouth daily. , Disp: , Rfl:     lithium carbonate 300 mg capsule, Take 300 mg by mouth two (2) times a day., Disp: , Rfl:     Case discussed with collaborating physician Dr. Casper Ly and our impression and recommendations are as follows:     1. Chest pain:   - complaining of chest pain for the past few days   - HS Troponin 165, will continue to trend. - Initial  EKG is nonischemic.  - Echocardiogram to assess overall cardiac structure and function.   - Continue telemetry monitoring.   - Will initiate antianginal therapy with asa.   - Recommend ischemic evaluation.      2. Mitral valve replacement  - history of porcine valve replacement r/t rheumatic heart disease  - patient has not followed with cardiology since the valve was placed. - 2-D echo pending further evaluation. 3. Acute/chronic congestive heart failure:  - unknown EF, echo pending. BNP and cxr pending.   - per patient, she was told in the past that she had CHF  - again noted patient did not follow-up with cardiology in the outpatient setting. 4. Hypertension:   - blood pressure acceptable. - continue to monitor. 5. Obstructive sleep apnea:   - history per patient, she does not wear CPAP device, unable to tolerate, was told she had severe SAMANTHA. - recommend pulmonary consult       Thank you for involving us in the care of this patient. Please do not hesitate to call if additional questions arise. If after hours please call [phone]. Dr. Chicho Cardenas [facility]  [address], [address] [facility]  [phone]

## 2022-04-01 NOTE — PROGRESS NOTES
Reason for Admission: CVA                      RUR Score:  N/A                   Plan for utilizing home health: None @ this time/uses no DME/awaitingg therapy evals/recs. PCP: First and Last name:  Radha Rodney MD     Name of Practice:    Are you a current patient: Yes/No: Yes   Approximate date of last visit: Seen 3/29/22. Can you participate in a virtual visit with your PCP: Yes/Call                  Current Advanced Directive/Advance Care Plan: Full Code      Healthcare Decision Maker:             Primary Decision Maker: Dorcas Persons - Spouse - [phone]                  Transition of Care Plan: D/C Plan is home with family &  to transport pt home upon discharge.

## 2022-04-01 NOTE — PROGRESS NOTES
SPEECH PATHOLOGY BEDSIDE SWALLOW EVALUATION/DISCHARGE  Patient: Walker Curry (67 y.o. female)  Date: 4/1/2022  Primary Diagnosis: CVA (cerebrovascular accident) [facility]) [I63.9]       Precautions: aspiration       ASSESSMENT :  Based on the objective data described below, the patient presents with oropharyngeal sw function and speech/lang function WNL and baseline per pt and family report. Pt seen in ED, being admitted for CVA workup, c/b left sided weakness and speech disturbance. At time of assessment, pt's symptoms have resolved and pt's speech is WNL per conversation, as well as pt and family report. Pt denies dysphagia. Pt passed nsg sw screen per report. Pt is able to self-feed without difficulty. Pt with top dentures, 2 bottom implants but bottom dentures are missing. Vocal quality mildly harsh, consistent with hx of smoking. Oral motor function WNL. Geographical tongue (near tongue tip) is noted and pt reports this is baseline, from childhood and not a change. Pt tolerates trials of thin via cup/straw and hard solids without overt s/s aspiration or dysphagia. Skilled acute therapy provided by a speech-language pathologist is not indicated at this time. PLAN :  Recommendations: At this time, recommend initiate regular/thin diet with restrictions per MD, with pt to self-select foods she is able to masticate effectively. Pt is aware of need to avoid foods that are too difficult to chew without bottom teeth. Recommend aspiration and GERD precautions, meds as tolerated. No further SLP intervention indicated at this time. Will d/c SLP and please reconsult if/as medically indicated. Discharge Recommendations: None     SUBJECTIVE:   Patient alert, agreeable, pleasant. Pt's son and  present with pt's consent.      OBJECTIVE:     Past Medical History:   Diagnosis Date    [facility] [address]     Chronic obstructive pulmonary disease (HCC)     Chronic pain     TENS unit in back    [address] [address] [address] [address] [facility]     x 3    HTN (hypertension)      Past Surgical History:   Procedure Laterality Date    HX HIP REPLACEMENT      HX MITRAL VALVE REPLACEMENT  2009    bioprosthetic     Prior Level of Function/Home Situation:   Home Situation  Support Systems: Spouse/Significant Other,Child(calli)  Patient Expects to be Discharged to[de-identified] Home (Lives with .)  Diet prior to admission: regular/thin  Current Diet:  NPO   Cognitive and Communication Status:  Neurologic State: Alert  Orientation Level: Oriented X4  Cognition: Follows commands    Pain:  Pain Scale 1: Numeric (0 - 10)  Pain Intensity 1: 0     After treatment:   Patient left in no apparent distress in bed, Call bell within reach, Nursing notified and Caregiver / family present    COMMUNICATION/EDUCATION:   Patient was educated regarding BEFAST, diet recs, purpose of SLP assessment, monitoring for changes in function, swallow safety precautions. She demonstrated Good understanding as evidenced by verbal responsiveness. The patient's plan of care including recommendations recommended diet changes were discussed with: Registered nurse and Physician.      Thank you for this referral.  Britni Law  Time Calculation: 12 mins

## 2022-04-01 NOTE — PROGRESS NOTES
4/1/22. PCP is Dr. Mirna Reyna. - seen on 3/29/22. D/C Plan is home &  Aylin Inman @ [phone]) to transport home upon discharge. Pt uses no DME/no home health @ this time.

## 2022-04-01 NOTE — PROGRESS NOTES
Patient admitted to room 563 accompanied my  and son. Patient alert and oriented x4. Patient on neurochecks, speech clear, 0 weakness in extremities noted. Patient states symptoms from earlier have subsided. Dual skin assessment performed with Helio Santos RN. Skin intact. 0 complains of pain or discomfort to note.

## 2022-04-01 NOTE — MED STUDENT NOTES
History & Physical    Primary Care Provider: Marguerite Metz MD  Source of Information: Patient/family     History of Presenting Illness:   Ben Styles is a 73 y.o. female with a PMH of chronic pain syndrome, 2 back fusions, a mitral valve replacement, hip replacement, COPD, emphysema, HTN, and Hx of 3 strokes (last 16 years ago) presents to the ED with slurred speech, left sided weakness, and inability to write. Patient states that she was at work doing paperwork when she felt left arm weakness and agraphia. She states she could not articulate any words at the time. She states that her stroke 16 years ago presented with similar symptoms. Patient did not have slurred speech or weakness upon examination. She said that she had pneumonia 2 weeks ago and has been on antibiotics and a steroid since. Patient has smoked 1 ppd for 57 years. She denies any alcohol use. 4/1 troponin (165), creatinine (1.68)  CTA head showed no large vessel occlusion. Intracranial atherosclerosis without high grade intracranial stenosis. Review of Systems:  Constitutional: negative  Respiratory: positive for wheezing  Cardiovascular: negative  Gastrointestinal: negative  Genitourinary:negative  Musculoskeletal:negative  Neurological: positive for speech problems, coordination problems, gait problems and weakness     Past Medical History:   Diagnosis Date    [facility] 1 disorder [facility]     CVA (cerebral vascular accident) ([facility]     HTN (hypertension)         History reviewed. No pertinent surgical history. Prior to Admission medications    Medication Sig Start Date End Date Taking? Authorizing Provider   losartan (COZAAR) 50 mg tablet Take 50 mg by mouth daily. 1/31/22  Yes Other, MD Rajinder   lithium carbonate 300 mg capsule Take 300 mg by mouth two (2) times a day. 3/24/22  Yes Other, MD Rajinder       Allergies   Allergen Reactions    Penicillins Unknown (comments)        History reviewed.  No pertinent family history. Social History     Socioeconomic History    Marital status:    Tobacco Use    Smoking status: Current Every Day Smoker    Smokeless tobacco: Never Used   Substance and Sexual Activity    Alcohol use: Never    Drug use: Never            CODE STATUS:  DNR    Full    Other      Objective:     Physical Exam:     Visit Vitals  BP (!) 168/81   Pulse 85   Temp 98 °F (36.7 °C)   Resp 20   Ht 5' 7\" (1.702 m)   Wt 150 lb (68 kg)   SpO2 94%   BMI 23.49 kg/m²      O2 Device: None (Room air)    General:  Alert, cooperative, no distress, appears stated age. Head:  Normocephalic, without obvious abnormality, atraumatic. Eyes:  Conjunctivae/corneas clear. PERRL, EOMs intact. Nose: Nares normal. Septum midline. Mucosa normal. No drainage or sinus tenderness. Throat: Lips, mucosa, and tongue normal. Teeth and gums normal.   Neck: Supple, symmetrical, trachea midline, no adenopathy, thyroid: no enlargement/tenderness/nodules, no carotid bruit and no JVD. Back:   Symmetric, no curvature. ROM normal. No CVA tenderness. Lungs:    Wheezes bilaterally   Chest wall:  No tenderness or deformity. Heart:  Regular rate and rhythm, S1, S2 normal, no murmur, click, rub or gallop. Abdomen:   Soft, non-tender. Bowel sounds normal. No masses,  No organomegaly. Extremities: Extremities normal, atraumatic, no cyanosis or edema. Pulses: 2+ and symmetric all extremities. Skin: Skin color, texture, turgor normal. No rashes or lesions   Neurologic: CNII-XII intact. No motor or sensory deficits.         24 Hour Results:    Recent Results (from the past 24 hour(s))   GLUCOSE, POC    Collection Time: 04/01/22  1:14 PM   Result Value Ref Range    Glucose (POC) 128 (H) 65 - 117 mg/dL    Performed by [employer] [facility]    CBC WITH AUTOMATED DIFF    Collection Time: 04/01/22  1:49 PM   Result Value Ref Range    WBC 11.8 (H) 3.6 - 11.0 K/uL    RBC 3.73 (L) 3.80 - 5.20 M/uL    HGB 11.1 (L) 11.5 - 16.0 g/dL    HCT 36.3 35.0 - 47.0 %    MCV 97.3 80.0 - 99.0 FL    MCH 29.8 26.0 - 34.0 PG    MCHC 30.6 30.0 - 36.5 g/dL    RDW 14.2 11.5 - 14.5 %    PLATELET 478 289 - 017 K/uL    MPV 9.8 8.9 - 12.9 FL    NRBC 0.0 0.0  WBC    ABSOLUTE NRBC 0.00 0.00 - 0.01 K/uL    NEUTROPHILS 83 (H) 32 - 75 %    LYMPHOCYTES 6 (L) 12 - 49 %    MONOCYTES 6 5 - 13 %    EOSINOPHILS 5 0 - 7 %    BASOPHILS 0 0 - 1 %    IMMATURE GRANULOCYTES 0 0 - 0.5 %    ABS. NEUTROPHILS 9.6 (H) 1.8 - 8.0 K/UL    ABS. LYMPHOCYTES 0.8 0.8 - 3.5 K/UL    ABS. MONOCYTES 0.8 0.0 - 1.0 K/UL    ABS. EOSINOPHILS 0.6 (H) 0.0 - 0.4 K/UL    ABS. BASOPHILS 0.0 0.0 - 0.1 K/UL    ABS. IMM. GRANS. 0.1 (H) 0.00 - 0.04 K/UL    DF AUTOMATED     METABOLIC PANEL, COMPREHENSIVE    Collection Time: 04/01/22  1:49 PM   Result Value Ref Range    Sodium 134 (L) 136 - 145 mmol/L    Potassium 3.8 3.5 - 5.1 mmol/L    Chloride 99 97 - 108 mmol/L    CO2 31 21 - 32 mmol/L    Anion gap 4 (L) 5 - 15 mmol/L    Glucose 97 65 - 100 mg/dL    BUN 16 6 - 20 mg/dL    Creatinine 1.68 (H) 0.55 - 1.02 mg/dL    BUN/Creatinine ratio 10 (L) 12 - 20      GFR est AA 37 (L) >60 ml/min/1.73m2    GFR est non-AA 31 (L) >60 ml/min/1.73m2    Calcium 9.0 8.5 - 10.1 mg/dL    Bilirubin, total 0.3 0.2 - 1.0 mg/dL    AST (SGOT) 13 (L) 15 - 37 U/L    ALT (SGPT) 25 12 - 78 U/L    Alk.  phosphatase 130 (H) 45 - 117 U/L    Protein, total 6.6 6.4 - 8.2 g/dL    Albumin 3.5 3.5 - 5.0 g/dL    Globulin 3.1 2.0 - 4.0 g/dL    A-G Ratio 1.1 1.1 - 2.2     PROTHROMBIN TIME + INR    Collection Time: 04/01/22  1:49 PM   Result Value Ref Range    Prothrombin time 13.3 11.9 - 14.6 sec    INR 1.0 0.9 - 1.1     PTT    Collection Time: 04/01/22  1:49 PM   Result Value Ref Range    aPTT 32.3 21.2 - 34.1 sec    aPTT, therapeutic range   82 - 109 sec   TROPONIN-HIGH SENSITIVITY    Collection Time: 04/01/22  1:49 PM   Result Value Ref Range    Troponin-High Sensitivity 165 (HH) 0 - 51 ng/L   LITHIUM    Collection Time: 04/01/22  1:49 PM   Result Value Ref Range    Lithium level 1.10 0.60 - 1.20 mmol/L         Imaging:   CTA CODE NEURO HEAD AND NECK W CONT   Final Result      1. No large vessel occlusion. Intracranial atherosclerosis without high-grade   intracranial stenosis. 2. No hemodynamically significant stenosis or cervical carotid or vertebral   arteries. All measurements are based on NASCET-like criteria. The results were discussed with/ relayed to Alfa Jenkins RN Charge at the   completion of the performance of the examination. CT CODE NEURO HEAD WO CONTRAST   Final Result      Chronic findings.       I called the report to Dr. Calixto Tobin 4/1/2022 1:32 PM.      MRI BRAIN WO CONT    (Results Pending)   DUPLEX CAROTID BILATERAL    (Results Pending)           Assessment:     TIA    Hx of Stroke x3- last was 2006    Chronic Pain Syndrome- hasn't been on opioids for 3 years    Mitral Valve Replacement due to Rheumatic Fever 2009    Hip Replacement 2010    COPD    Emphysema    Essential HTN    Hx of Pneumonia 2 weeks ago- took antibiotics and steroids      Plan:     Admit to cardiac telemetry  Trend troponins  Get Echo  Get Carotid Duplex  Pt/Ot  Consult cardiology  Monitor renal function   Give IV fluids  Atorvastatin 40mg PO 1x daily  Enoxaparin 40mg Injection  Asprin 81mg  Home medications were reviewed    Signed By: Reyna Land     April 1, 2022

## 2022-04-01 NOTE — ED NOTES
TRANSFER - OUT REPORT:    Verbal report given to Abena(name) on [facility]  being transferred to (unit) for routine progression of care       Report consisted of patients Situation, Background, Assessment and   Recommendations(SBAR). Information from the following report(s) SBAR, ED Summary, [facility] and Cardiac Rhythm NSR was reviewed with the receiving nurse. Lines:   Peripheral IV 04/01/22 Right Antecubital (Active)        Opportunity for questions and clarification was provided.       Patient transported with:   CSR

## 2022-04-02 ENCOUNTER — APPOINTMENT (OUTPATIENT)
Dept: NON INVASIVE DIAGNOSTICS | Age: 67
DRG: 069 | End: 2022-04-02
Attending: INTERNAL MEDICINE
Payer: MEDICARE

## 2022-04-02 LAB
AMPHET UR QL SCN: NEGATIVE
ANION GAP SERPL CALC-SCNC: 5 MMOL/L (ref 5–15)
BARBITURATES UR QL SCN: NEGATIVE
BENZODIAZ UR QL: NEGATIVE
BUN SERPL-MCNC: 20 MG/DL (ref 6–20)
BUN/CREAT SERPL: 11 (ref 12–20)
CA-I BLD-MCNC: 8.9 MG/DL (ref 8.5–10.1)
CANNABINOIDS UR QL SCN: NEGATIVE
CHLORIDE SERPL-SCNC: 102 MMOL/L (ref 97–108)
CHOLEST SERPL-MCNC: 140 MG/DL
CO2 SERPL-SCNC: 28 MMOL/L (ref 21–32)
COCAINE UR QL SCN: NEGATIVE
CREAT SERPL-MCNC: 1.82 MG/DL (ref 0.55–1.02)
DRUG SCRN COMMENT,DRGCM: NORMAL
ECHO AO ASC DIAM: 2.6 CM
ECHO AO ASCENDING AORTA INDEX: 1.45 CM/M2
ECHO AO ROOT DIAM: 3 CM
ECHO AO ROOT INDEX: 1.68 CM/M2
ECHO AV AREA PEAK VELOCITY: 2.1 CM2
ECHO AV AREA VTI: 1.9 CM2
ECHO AV AREA/BSA PEAK VELOCITY: 1.2 CM2/M2
ECHO AV AREA/BSA VTI: 1.1 CM2/M2
ECHO AV MEAN GRADIENT: 8 MMHG
ECHO AV MEAN VELOCITY: 1.3 M/S
ECHO AV PEAK GRADIENT: 13 MMHG
ECHO AV PEAK VELOCITY: 1.8 M/S
ECHO AV VELOCITY RATIO: 0.61
ECHO AV VTI: 42.4 CM
ECHO EST RA PRESSURE: 3 MMHG
ECHO LA AREA 2C: 10.3 CM2
ECHO LA AREA 4C: 6.8 CM2
ECHO LA DIAMETER INDEX: 2.01 CM/M2
ECHO LA DIAMETER: 3.6 CM
ECHO LA MAJOR AXIS: 3.2 CM
ECHO LA MINOR AXIS: 4.1 CM
ECHO LA TO AORTIC ROOT RATIO: 1.2
ECHO LV E' LATERAL VELOCITY: 8 CM/S
ECHO LV E' SEPTAL VELOCITY: 11 CM/S
ECHO LV EDV A2C: 49 ML
ECHO LV EDV A4C: 22 ML
ECHO LV EDV INDEX A4C: 12 ML/M2
ECHO LV EDV NDEX A2C: 27 ML/M2
ECHO LV EJECTION FRACTION A2C: 80 %
ECHO LV EJECTION FRACTION A4C: 59 %
ECHO LV ESV A2C: 10 ML
ECHO LV ESV A4C: 9 ML
ECHO LV ESV INDEX A2C: 6 ML/M2
ECHO LV ESV INDEX A4C: 5 ML/M2
ECHO LV FRACTIONAL SHORTENING: 39 % (ref 28–44)
ECHO LV INTERNAL DIMENSION DIASTOLE INDEX: 2.01 CM/M2
ECHO LV INTERNAL DIMENSION DIASTOLIC: 3.6 CM (ref 3.9–5.3)
ECHO LV INTERNAL DIMENSION SYSTOLIC INDEX: 1.23 CM/M2
ECHO LV INTERNAL DIMENSION SYSTOLIC: 2.2 CM
ECHO LV IVSD: 1 CM (ref 0.6–0.9)
ECHO LV MASS 2D: 92.8 G (ref 67–162)
ECHO LV MASS INDEX 2D: 51.8 G/M2 (ref 43–95)
ECHO LV POSTERIOR WALL DIASTOLIC: 0.8 CM (ref 0.6–0.9)
ECHO LV RELATIVE WALL THICKNESS RATIO: 0.44
ECHO LVOT AREA: 3.5 CM2
ECHO LVOT AV VTI INDEX: 0.55
ECHO LVOT DIAM: 2.1 CM
ECHO LVOT MEAN GRADIENT: 3 MMHG
ECHO LVOT PEAK GRADIENT: 5 MMHG
ECHO LVOT PEAK VELOCITY: 1.1 M/S
ECHO LVOT STROKE VOLUME INDEX: 45.1 ML/M2
ECHO LVOT SV: 80.7 ML
ECHO LVOT VTI: 23.3 CM
ECHO MV A VELOCITY: 1.49 M/S
ECHO MV AREA VTI: 1.7 CM2
ECHO MV E VELOCITY: 1.75 M/S
ECHO MV E/A RATIO: 1.17
ECHO MV E/E' LATERAL: 21.88
ECHO MV E/E' RATIO (AVERAGED): 18.89
ECHO MV E/E' SEPTAL: 15.91
ECHO MV LVOT VTI INDEX: 2.01
ECHO MV MAX VELOCITY: 1.8 M/S
ECHO MV MEAN GRADIENT: 5 MMHG
ECHO MV MEAN VELOCITY: 1 M/S
ECHO MV PEAK GRADIENT: 13 MMHG
ECHO MV REGURGITANT PEAK GRADIENT: 12 MMHG
ECHO MV REGURGITANT PEAK VELOCITY: 1.7 M/S
ECHO MV REGURGITANT VTIA: 44.3 CM
ECHO MV VTI: 46.9 CM
ECHO PV MAX VELOCITY: 0.7 M/S
ECHO PV MEAN GRADIENT: 1 MMHG
ECHO PV MEAN VELOCITY: 0.5 M/S
ECHO PV PEAK GRADIENT: 2 MMHG
ECHO PV VTI: 13.8 CM
ECHO RIGHT VENTRICULAR SYSTOLIC PRESSURE (RVSP): 21 MMHG
ECHO RV BASAL DIMENSION: 3.5 CM
ECHO RV MID DIMENSION: 2.2 CM
ECHO RV TAPSE: 1.6 CM (ref 1.5–2)
ECHO TV REGURGITANT MAX VELOCITY: 2.14 M/S
ECHO TV REGURGITANT PEAK GRADIENT: 18 MMHG
GLUCOSE SERPL-MCNC: 117 MG/DL (ref 65–100)
HDLC SERPL-MCNC: 41 MG/DL
HDLC SERPL: 3.4 {RATIO} (ref 0–5)
LDLC SERPL CALC-MCNC: 86.2 MG/DL (ref 0–100)
LEFT ARM BP: 172 MMHG
LEFT CCA DIST DIAS: 17.2 CM/S
LEFT CCA DIST SYS: 96.2 CM/S
LEFT CCA PROX DIAS: 18.9 CM/S
LEFT CCA PROX SYS: 107 CM/S
LEFT ECA DIAS: 0 CM/S
LEFT ECA SYS: 146 CM/S
LEFT ICA DIST DIAS: 39.7 CM/S
LEFT ICA DIST SYS: 159 CM/S
LEFT ICA PROX DIAS: 28.6 CM/S
LEFT ICA PROX SYS: 126 CM/S
LEFT ICA/CCA SYS: 1.31
LEFT VERTEBRAL DIAS: 19.9 CM/S
LEFT VERTEBRAL SYS: 102 CM/S
LIPID PROFILE,FLP: NORMAL
METHADONE UR QL: NEGATIVE
OPIATES UR QL: NEGATIVE
PCP UR QL: NEGATIVE
POTASSIUM SERPL-SCNC: 3.8 MMOL/L (ref 3.5–5.1)
RIGHT ARM BP: 159 MMHG
RIGHT CCA DIST DIAS: 30.6 CM/S
RIGHT CCA DIST SYS: 116 CM/S
RIGHT CCA PROX DIAS: 15.3 CM/S
RIGHT CCA PROX SYS: 106 CM/S
RIGHT ECA DIAS: 22.9 CM/S
RIGHT ECA SYS: 298 CM/S
RIGHT ICA DIST DIAS: 20.9 CM/S
RIGHT ICA DIST SYS: 153 CM/S
RIGHT ICA PROX DIAS: 34.4 CM/S
RIGHT ICA PROX SYS: 198 CM/S
RIGHT ICA/CCA SYS: 1.71
RIGHT VERTEBRAL DIAS: 12.4 CM/S
RIGHT VERTEBRAL SYS: 74.1 CM/S
SODIUM SERPL-SCNC: 135 MMOL/L (ref 136–145)
TRIGL SERPL-MCNC: 64 MG/DL (ref ?–150)
TROPONIN-HIGH SENSITIVITY: 139 NG/L (ref 0–51)
TROPONIN-HIGH SENSITIVITY: 153 NG/L (ref 0–51)
VAS LEFT SUBCLAVIAN PROX EDV: 0 CM/S
VAS LEFT SUBCLAVIAN PROX PSV: 155 CM/S
VAS RIGHT SUBCLAVIAN PROX EDV: 0 CM/S
VAS RIGHT SUBCLAVIAN PROX PSV: 110 CM/S
VLDLC SERPL CALC-MCNC: 12.8 MG/DL

## 2022-04-02 PROCEDURE — 80048 BASIC METABOLIC PNL TOTAL CA: CPT

## 2022-04-02 PROCEDURE — 74011000250 HC RX REV CODE- 250: Performed by: HOSPITALIST

## 2022-04-02 PROCEDURE — 93880 EXTRACRANIAL BILAT STUDY: CPT

## 2022-04-02 PROCEDURE — 74011250637 HC RX REV CODE- 250/637: Performed by: HOSPITALIST

## 2022-04-02 PROCEDURE — 84484 ASSAY OF TROPONIN QUANT: CPT

## 2022-04-02 PROCEDURE — 80307 DRUG TEST PRSMV CHEM ANLYZR: CPT

## 2022-04-02 PROCEDURE — 93306 TTE W/DOPPLER COMPLETE: CPT

## 2022-04-02 PROCEDURE — 36415 COLL VENOUS BLD VENIPUNCTURE: CPT

## 2022-04-02 PROCEDURE — 65270000029 HC RM PRIVATE

## 2022-04-02 PROCEDURE — 80061 LIPID PANEL: CPT

## 2022-04-02 PROCEDURE — 93005 ELECTROCARDIOGRAM TRACING: CPT

## 2022-04-02 PROCEDURE — 74011250636 HC RX REV CODE- 250/636: Performed by: INTERNAL MEDICINE

## 2022-04-02 PROCEDURE — 74011250637 HC RX REV CODE- 250/637: Performed by: INTERNAL MEDICINE

## 2022-04-02 RX ORDER — SODIUM CHLORIDE 9 MG/ML
75 INJECTION, SOLUTION INTRAVENOUS CONTINUOUS
Status: DISCONTINUED | OUTPATIENT
Start: 2022-04-02 | End: 2022-04-02

## 2022-04-02 RX ORDER — METOPROLOL TARTRATE 25 MG/1
25 TABLET, FILM COATED ORAL EVERY 12 HOURS
Status: DISCONTINUED | OUTPATIENT
Start: 2022-04-02 | End: 2022-04-04 | Stop reason: HOSPADM

## 2022-04-02 RX ORDER — LIDOCAINE 4 G/100G
1 PATCH TOPICAL EVERY 24 HOURS
Status: DISCONTINUED | OUTPATIENT
Start: 2022-04-02 | End: 2022-04-04 | Stop reason: HOSPADM

## 2022-04-02 RX ORDER — CHOLECALCIFEROL (VITAMIN D3) 125 MCG
5 CAPSULE ORAL
Status: DISCONTINUED | OUTPATIENT
Start: 2022-04-02 | End: 2022-04-04 | Stop reason: HOSPADM

## 2022-04-02 RX ADMIN — ACETAMINOPHEN 650 MG: 325 TABLET ORAL at 13:54

## 2022-04-02 RX ADMIN — ATORVASTATIN CALCIUM 40 MG: 40 TABLET, FILM COATED ORAL at 21:35

## 2022-04-02 RX ADMIN — ASPIRIN 81 MG 81 MG: 81 TABLET ORAL at 08:02

## 2022-04-02 RX ADMIN — METOPROLOL TARTRATE 25 MG: 25 TABLET, FILM COATED ORAL at 21:35

## 2022-04-02 RX ADMIN — MELATONIN TAB 5 MG 5 MG: 5 TAB at 21:35

## 2022-04-02 RX ADMIN — METOPROLOL TARTRATE 25 MG: 25 TABLET, FILM COATED ORAL at 13:54

## 2022-04-02 RX ADMIN — ACETAMINOPHEN 650 MG: 325 TABLET ORAL at 21:34

## 2022-04-02 RX ADMIN — ACETAMINOPHEN 650 MG: 325 TABLET ORAL at 08:02

## 2022-04-02 RX ADMIN — ENOXAPARIN SODIUM 40 MG: 40 INJECTION SUBCUTANEOUS at 18:53

## 2022-04-02 NOTE — PROGRESS NOTES
CARDIOLOGY PROGRESS NOTE    Patient Name: Dorothy Hilario  : 1955  AGE: 73 y.o. Gender: female        Patient seen and examined. This is a patient who is followed for chest pain. She has intermittent chest pain still present. No other complaints reported. Telemetry reviewed, there were no events noted in the past 24 hours. Sinus rhythm    Pertinent review of systems items noted above, all other systems are negative. Current medications reviewed. Physical Examination    Allergies   Allergen Reactions    Penicillins Unknown (comments)     Visit Vitals  BP (!) 146/62 (BP 1 Location: Right leg, BP Patient Position: At rest)   Pulse 80   Temp 97.7 °F (36.5 °C)   Resp 21   Ht 5' 7\" (1.702 m)   Wt 68 kg (150 lb)   SpO2 97%   Breastfeeding No   BMI 23.49 kg/m²         No apparent distress. Heart is regular, rate and rhythm. Normal S1, S2, no murmurs are appreciated. Lungs are clear bilaterally. Abdomen is soft, nontender, normal bowel sounds. Extremities have no edema. Labs reviewed: All lab results for the last 24 hours reviewed. TTE: Normal LVEF. Normal Mitral valve function    IMPRESSION AND RECOMMENDATIONS:  1. Chest pain with troponin elevation  2. S/p Mitral valve repalcement  3. Prior history of heart failure - Normal LVEF on echocardiogram  4. Hypertension  5. Severe untreated SAMANTHA  6. TIA on presentation    - May consider cardiac cath vs nuclear stress test due to persistently elevated troponins and otherwise normal LVEF  -Continue aspirin  - Prior history of acute ICH. Trend troponins, if further elevated would need therapeutic dose heparin, but will not start at this time. - Continue other medications as ordered. Please do not hesitate to call if additional questions arise.     Pa Pino MD  2022

## 2022-04-02 NOTE — CONSULTS
NEUROLOGY CONSULT    Name Walker Curry Age 73 y.o. MRN [id number]  1955     Referring Physician: Mich Walters MD    Chief Complaint: Left-sided weakness, dysarthria and inability to write     This is a 73 y.o. right handed  female with a history of strokes in the past and multiple other medical issues was admitted overnight through the ED where she presented with above-mentioned symptoms. Per patient there is how she had her previous strokes. She was at work in the fasting first was her inability to write and read what she was writing. She also could not comprehend the content and then noticed weakness of the left face arm and leg. She decided to go home and from there her son and daughter-in-law brought her to the hospital in their car. Per patient her arm was so weak that she could not even lift it. But now it has resolved. A CT scan of the head and a CTA of the head and neck done in the ED were unremarkable for any acute findings or stenotic disease. At the time of my evaluation this morning the patient is feeling much better as her left-sided numbness and weakness have gone    Assessment and Plan:  1. TIA versus stroke: The patient's symptoms resolved by the time she was in the hospital.  Agree with the 2D echo and the carotid Doppler and starting aspirin and atorvastatin. Her CT is unremarkable. Doing an MRI is not going to change the management even if we see a small lacunar infarct. The management will still be aspirin 81 mg a day since she had not been on any antiplatelets prior to the stroke, as such I will not order the MRI. If the 2D echo and carotid Doppler are unremarkable she can be discharged on aspirin 81 mg once a day. 2.  History of strokes in the past x3.  3.  Chest pain with elevated troponin, possible acute coronary syndrome  4. Chronic pain syndrome currently on TENS unit  5. Essential hypertension.   6.  History of mitral valve replacement due to rheumatic fever. 7.  Tobacco dependence syndrome: Patient needs counseling to quit smoking    Thank you for the consult    Allergies   Allergen Reactions    Penicillins Unknown (comments)     Current Facility-Administered Medications   Medication Dose Route Frequency    metoprolol tartrate (LOPRESSOR) tablet 25 mg  25 mg Oral Q12H    acetaminophen (TYLENOL) tablet 650 mg  650 mg Oral Q4H PRN    Or    acetaminophen (TYLENOL) solution 650 mg  650 mg Per NG tube Q4H PRN    Or    acetaminophen (TYLENOL) suppository 650 mg  650 mg Rectal Q4H PRN    enoxaparin (LOVENOX) injection 40 mg  40 mg SubCUTAneous Q24H    aspirin chewable tablet 81 mg  81 mg Oral DAILY    atorvastatin (LIPITOR) tablet 40 mg  40 mg Oral QHS    0.9% sodium chloride infusion  100 mL/hr IntraVENous CONTINUOUS     Past Medical History:   Diagnosis Date    Bipolar 1 disorder (HCC)     Chronic obstructive pulmonary disease (HCC)     Chronic pain     TENS unit in back    CVA (cerebral vascular accident) ([facility] [address]     x 3    HTN (hypertension)      Social History     Tobacco Use    Smoking status: Current Every Day Smoker     Packs/day: 2.00     Years: 57.00     Pack years: 114.00    Smokeless tobacco: Never Used   Substance Use Topics    Alcohol use: Never    Drug use: Never     Exam  Visit Vitals  BP (!) 146/62 (BP 1 Location: Right leg, BP Patient Position: At rest)   Pulse 80   Temp 97.7 °F (36.5 °C)   Resp 21   Ht 5' 7\" (1.702 m)   Wt 68 kg (150 lb)   SpO2 97%   Breastfeeding No   BMI 23.49 kg/m²     General: Well developed, well nourished. Patient in no distress   Head: Normocephalic, atraumatic, anicteric sclera   Neck Normal ROM, No thyromegally               Ext: No pedal edema   Skin: Supple no rash     NeurologicExam:  Mental Status: Alert and oriented to person place and time   Speech: Fluent no aphasia or dysarthria.    Cranial Nerves:   Pupils are equal round and reactive to light and accommodation, extraocular movements are intact and full, visual fields are intact by confrontation, no nystagmus noted, face is symmetric, sensation on face is intact and symmetric, hearing is intact and symmetric, tongue and uvula are in midline with normal movements, palate is elevating equally, shoulder shrug is intact and symmetric. Motor:  Full and symmetric strength of upper and lower ext . Normal bulk and tone. Reflexes:   Deep tendon reflexes 2/4 and symmetric. Sensory:   Symmetric and intact    Gait:  Gait is deferred   Tremor:   No tremor noted. Cerebellar:  Coordination intact for finger-nose-finger. Neurovascular: No carotid bruits.  No JVD   Lab Review  Lab Results   Component Value Date/Time    WBC 11.8 (H) 04/01/2022 01:49 PM    HCT 36.3 04/01/2022 01:49 PM    HGB 11.1 (L) 04/01/2022 01:49 PM    PLATELET 219 36/87/7193 01:49 PM     Lab Results   Component Value Date/Time    Sodium 135 (L) 04/02/2022 01:40 AM    Potassium 3.8 04/02/2022 01:40 AM    Chloride 102 04/02/2022 01:40 AM    CO2 28 04/02/2022 01:40 AM    Glucose 117 (H) 04/02/2022 01:40 AM    BUN 20 04/02/2022 01:40 AM    Creatinine 1.82 (H) 04/02/2022 01:40 AM    Calcium 8.9 04/02/2022 01:40 AM     No results found for: B12LT, FOL, RBCF  Lab Results   Component Value Date/Time    LDL, calculated 86.2 04/02/2022 01:40 AM     No results found for: HBA1C, [id number], [id number]  No components found for: TROPQUANT  No results found for: MISHEL

## 2022-04-02 NOTE — PROGRESS NOTES
Hospitalist Progress Note               Daily Progress Note: 4/2/2022      Subjective: The patient is seen for follow up. Ben Styles is a 73 y.o. female with a PMH of chronic pain syndrome, 2 back fusions, a mitral valve replacement, hip replacement, COPD, emphysema, HTN, and Hx of 3 strokes (last 16 years ago) presents to the ED with slurred speech, left sided weakness, and inability to write. Patient states that she was at work doing paperwork when she felt left arm weakness and agraphia. She states she could not articulate any words at the time. She states that her stroke 16 years ago presented with similar symptoms. Patient did not have slurred speech or weakness upon examination. She said that she had pneumonia 2 weeks ago and has been on antibiotics and a steroid since. Patient has smoked 1 ppd for 57 years. She denies any alcohol use. 4/1 troponin (165), creatinine (1.68). No previous creatinine available     CTA head showed no large vessel occlusion. Intracranial atherosclerosis without high grade intracranial stenosis.     At the very end of my interview with patient she mentioned as an aside that she has been having some intermittent chest tightness the past several days. She never mentioned this to anybody else because she did not think it meant anything     Reports she was told not to take aspirin after her previous stroke because it was a bleed    ------    Patient seen for follow-up. Troponin overnight down slightly at 153. No recurrent neurologic symptoms. Her echo shows normal LV function. Toxicology screen negative. Her carotid Doppler studies is unremarkable.     Problem List:  Problem List as of 4/2/2022 Never Reviewed          Codes Class Noted - Resolved    CVA (cerebrovascular accident) [facility]) ICD-10-CM: I63.9  ICD-9-CM: 434.91  4/1/2022 - Present              Medications reviewed  Current Facility-Administered Medications   Medication Dose Route Frequency    acetaminophen (TYLENOL) tablet 650 mg  650 mg Oral Q4H PRN    Or    acetaminophen (TYLENOL) solution 650 mg  650 mg Per NG tube Q4H PRN    Or    acetaminophen (TYLENOL) suppository 650 mg  650 mg Rectal Q4H PRN    enoxaparin (LOVENOX) injection 40 mg  40 mg SubCUTAneous Q24H    aspirin chewable tablet 81 mg  81 mg Oral DAILY    atorvastatin (LIPITOR) tablet 40 mg  40 mg Oral QHS    0.9% sodium chloride infusion  75 mL/hr IntraVENous CONTINUOUS       Review of Systems:   A comprehensive review of systems was negative except for that written in the HPI. Objective:   Physical Exam:     Visit Vitals  BP (!) 146/62 (BP 1 Location: Right leg, BP Patient Position: At rest)   Pulse 80   Temp 97.7 °F (36.5 °C)   Resp 21   Ht 5' 7\" (1.702 m)   Wt 68 kg (150 lb)   SpO2 97%   Breastfeeding No   BMI 23.49 kg/m²      O2 Device: None (Room air)    Temp (24hrs), Av.1 °F (36.7 °C), Min:97.7 °F (36.5 °C), Max:98.6 °F (37 °C)    No intake/output data recorded.  1901 -  0700  In: -   Out: 200 [Urine:200]    General:   Awake and alert   Lungs:   Clear to auscultation bilaterally. Chest wall:  No tenderness or deformity. Heart:  Regular rate and rhythm, S1, S2 normal, no murmur, click, rub or gallop. Abdomen:   Soft, non-tender. Bowel sounds normal. No masses,  No organomegaly. Extremities: Extremities normal, atraumatic, no cyanosis or edema. Pulses: 2+ and symmetric all extremities. Skin: Skin color, texture, turgor normal. No rashes or lesions   Neurologic: CNII-XII intact.   No gross focal deficits         Data Review:       Recent Days:  Recent Labs     22  1349   WBC 11.8*   HGB 11.1*   HCT 36.3        Recent Labs     22  0140 22  1349   * 134*   K 3.8 3.8    99   CO2 28 31   * 97   BUN 20 16   CREA 1.82* 1.68*   CA 8.9 9.0   ALB  --  3.5   TBILI  --  0.3   ALT  --  25   INR  --  1.0     No results for input(s): PH, PCO2, PO2, HCO3, FIO2 in the last 72 hours. 24 Hour Results:  Recent Results (from the past 24 hour(s))   GLUCOSE, POC    Collection Time: 04/01/22  1:14 PM   Result Value Ref Range    Glucose (POC) 128 (H) 65 - 117 mg/dL    Performed by Lissett Fry    CBC WITH AUTOMATED DIFF    Collection Time: 04/01/22  1:49 PM   Result Value Ref Range    WBC 11.8 (H) 3.6 - 11.0 K/uL    RBC 3.73 (L) 3.80 - 5.20 M/uL    HGB 11.1 (L) 11.5 - 16.0 g/dL    HCT 36.3 35.0 - 47.0 %    MCV 97.3 80.0 - 99.0 FL    MCH 29.8 26.0 - 34.0 PG    MCHC 30.6 30.0 - 36.5 g/dL    RDW 14.2 11.5 - 14.5 %    PLATELET 841 297 - 742 K/uL    MPV 9.8 8.9 - 12.9 FL    NRBC 0.0 0.0  WBC    ABSOLUTE NRBC 0.00 0.00 - 0.01 K/uL    NEUTROPHILS 83 (H) 32 - 75 %    LYMPHOCYTES 6 (L) 12 - 49 %    MONOCYTES 6 5 - 13 %    EOSINOPHILS 5 0 - 7 %    BASOPHILS 0 0 - 1 %    IMMATURE GRANULOCYTES 0 0 - 0.5 %    ABS. NEUTROPHILS 9.6 (H) 1.8 - 8.0 K/UL    ABS. LYMPHOCYTES 0.8 0.8 - 3.5 K/UL    ABS. MONOCYTES 0.8 0.0 - 1.0 K/UL    ABS. EOSINOPHILS 0.6 (H) 0.0 - 0.4 K/UL    ABS. BASOPHILS 0.0 0.0 - 0.1 K/UL    ABS. IMM. GRANS. 0.1 (H) 0.00 - 0.04 K/UL    DF AUTOMATED     METABOLIC PANEL, COMPREHENSIVE    Collection Time: 04/01/22  1:49 PM   Result Value Ref Range    Sodium 134 (L) 136 - 145 mmol/L    Potassium 3.8 3.5 - 5.1 mmol/L    Chloride 99 97 - 108 mmol/L    CO2 31 21 - 32 mmol/L    Anion gap 4 (L) 5 - 15 mmol/L    Glucose 97 65 - 100 mg/dL    BUN 16 6 - 20 mg/dL    Creatinine 1.68 (H) 0.55 - 1.02 mg/dL    BUN/Creatinine ratio 10 (L) 12 - 20      GFR est AA 37 (L) >60 ml/min/1.73m2    GFR est non-AA 31 (L) >60 ml/min/1.73m2    Calcium 9.0 8.5 - 10.1 mg/dL    Bilirubin, total 0.3 0.2 - 1.0 mg/dL    AST (SGOT) 13 (L) 15 - 37 U/L    ALT (SGPT) 25 12 - 78 U/L    Alk.  phosphatase 130 (H) 45 - 117 U/L    Protein, total 6.6 6.4 - 8.2 g/dL    Albumin 3.5 3.5 - 5.0 g/dL    Globulin 3.1 2.0 - 4.0 g/dL    A-G Ratio 1.1 1.1 - 2.2     PROTHROMBIN TIME + INR    Collection Time: 04/01/22  1:49 PM   Result Value Ref Range    Prothrombin time 13.3 11.9 - 14.6 sec    INR 1.0 0.9 - 1.1     PTT    Collection Time: 04/01/22  1:49 PM   Result Value Ref Range    aPTT 32.3 21.2 - 34.1 sec    aPTT, therapeutic range   82 - 109 sec   TROPONIN-HIGH SENSITIVITY    Collection Time: 04/01/22  1:49 PM   Result Value Ref Range    Troponin-High Sensitivity 165 (HH) 0 - 51 ng/L   LITHIUM    Collection Time: 04/01/22  1:49 PM   Result Value Ref Range    Lithium level 1.10 0.60 - 1.20 mmol/L   NT-PRO BNP    Collection Time: 04/01/22  1:49 PM   Result Value Ref Range    NT pro- (H) <125 pg/mL   TROPONIN-HIGH SENSITIVITY    Collection Time: 04/01/22  7:18 PM   Result Value Ref Range    Troponin-High Sensitivity 153 (HH) 0 - 51 ng/L   TROPONIN-HIGH SENSITIVITY    Collection Time: 04/01/22 10:12 PM   Result Value Ref Range    Troponin-High Sensitivity 132 (HH) 0 - 51 ng/L   DRUG SCREEN, URINE    Collection Time: 04/02/22 12:10 AM   Result Value Ref Range    AMPHETAMINES Negative Negative      BARBITURATES Negative Negative      BENZODIAZEPINES Negative Negative      COCAINE Negative Negative      METHADONE Negative Negative      OPIATES Negative Negative      PCP(PHENCYCLIDINE) Negative Negative      THC (TH-CANNABINOL) Negative Negative      Drug screen comment        This test is a screen for drugs of abuse in a medical setting only (i.e., they are unconfirmed results and as such must not be used for non-medical purposes, e.g.,employment testing, legal testing). Due to its inherent nature, false positive (FP) and false negative (FN) results may be obtained. Therefore, if necessary for medical care, recommend confirmation of positive findings by GC/MS.    TROPONIN-HIGH SENSITIVITY    Collection Time: 04/02/22  1:09 AM   Result Value Ref Range    Troponin-High Sensitivity 153 (HH) 0 - 51 ng/L   LIPID PANEL    Collection Time: 04/02/22  1:40 AM   Result Value Ref Range    LIPID PROFILE        Cholesterol, total 140 <200 mg/dL Triglyceride 64 <150 mg/dL    HDL Cholesterol 41 mg/dL    LDL, calculated 86.2 0 - 100 mg/dL    VLDL, calculated 12.8 mg/dL    CHOL/HDL Ratio 3.4 0.0 - 5.0     METABOLIC PANEL, BASIC    Collection Time: 04/02/22  1:40 AM   Result Value Ref Range    Sodium 135 (L) 136 - 145 mmol/L    Potassium 3.8 3.5 - 5.1 mmol/L    Chloride 102 97 - 108 mmol/L    CO2 28 21 - 32 mmol/L    Anion gap 5 5 - 15 mmol/L    Glucose 117 (H) 65 - 100 mg/dL    BUN 20 6 - 20 mg/dL    Creatinine 1.82 (H) 0.55 - 1.02 mg/dL    BUN/Creatinine ratio 11 (L) 12 - 20      GFR est AA 34 (L) >60 ml/min/1.73m2    GFR est non-AA 28 (L) >60 ml/min/1.73m2    Calcium 8.9 8.5 - 10.1 mg/dL   ECHO ADULT COMPLETE    Collection Time: 04/02/22  9:19 AM   Result Value Ref Range    LV EDV A2C 49 mL    LV EDV A4C 22 mL    LV ESV A2C 10 mL    LV ESV A4C 9 mL    IVSd 1.0 (A) 0.6 - 0.9 cm    LVIDd 3.6 (A) 3.9 - 5.3 cm    LVIDs 2.2 cm    LVOT Diameter 2.1 cm    LVOT Mean Gradient 3 mmHg    LVOT VTI 23.3 cm    LVOT Peak Velocity 1.1 m/s    LVOT Peak Gradient 5 mmHg    LVPWd 0.8 0.6 - 0.9 cm    LV E' Lateral Velocity 8 cm/s    LV E' Septal Velocity 11 cm/s    LV Ejection Fraction A2C 80 %    LV Ejection Fraction A4C 59 %    LVOT Area 3.5 cm2    LVOT SV 80.7 ml    LA Minor Axis 4.1 cm    LA Major [place] 3.2 cm    LA Area 2C 10.3 cm2    LA Area 4C 6.8 cm2    LA Diameter 3.6 cm    AV Mean Gradient 8 mmHg    AV VTI 42.4 cm    AV Mean Velocity 1.3 m/s    AV Peak Velocity 1.8 m/s    AV Peak Gradient 13 mmHg    AV Area by VTI 1.9 cm2    AV Area by Peak Velocity 2.1 cm2    Aortic Root 3.0 cm    Ascending Aorta 2.6 cm    MR VTI 44.3 cm    MV Mean Gradient 5 mmHg    MV VTI 46.9 cm    MV Mean Velocity 1.0 m/s    MR Peak Velocity 1.7 m/s    MR Peak Gradient 12 mmHg    MV Max Velocity 1.8 m/s    MV Peak Gradient 13 mmHg    MV A Velocity 1.49 m/s    MV E Velocity 1.75 m/s    MV Area by VTI 1.7 cm2    PV Mean Gradient 1 mmHg    PV VTI 13.8 cm    PV Mean Velocity 0.5 m/s    PV Max Velocity 0.7 m/s    PV Peak Gradient 2 mmHg    TR Max Velocity 2.14 m/s    TR Peak Gradient 18 mmHg    TAPSE 1.6 1.5 - 2.0 cm    Fractional Shortening 2D 39 28 - 44 %    LV ESV Index A4C 5 mL/m2    LV EDV Index A4C 12 mL/m2    LV ESV Index A2C 6 mL/m2    LV EDV Index A2C 27 mL/m2    LVIDd Index 2.01 cm/m2    LVIDs Index 1.23 cm/m2    LV RWT Ratio 0.44     LV Mass 2D 92.8 67 - 162 g    LV Mass 2D Index 51.8 43 - 95 g/m2    MV E/A 1.17     E/E' Ratio (Averaged) 18.89     E/E' Lateral 21.88     E/E' Septal 15.91     LVOT Stroke Volume Index 45.1 mL/m2    LA Size Index 2.01 cm/m2    LA/AO Root Ratio 1.20     Ao Root Index 1.68 cm/m2    Ascending Aorta Index 1.45 cm/m2    AV Velocity Ratio 0.61     LVOT:AV VTI Index 0.55     BARTOLOME/BSA VTI 1.1 cm2/m2    BARTOLOME/BSA Peak Velocity 1.2 cm2/m2    MV:LVOT VTI Index 2.01     RV Basal Dimension 3.5 cm    RV Mid Dimension 2.2 cm    Est. RA Pressure 3 mmHg    RVSP 21 mmHg   DUPLEX CAROTID BILATERAL    Collection Time: 04/02/22  9:36 AM   Result Value Ref Range    Left CCA dist sys 96.2 cm/s    Left CCA dist wayne 17.2 cm/s    Left CCA prox sys 107.0 cm/s    Left CCA prox wayne 18.9 cm/s    Left ICA dist sys 159.0 cm/s    Left ICA dist wayne 39.7 cm/s    Left ICA prox sys 126.0 cm/s    Left ICA prox wayne 28.6 cm/s    Left ECA sys 146.0 cm/s    LEFT EXTERNAL CAROTID ARTERY D 0.00 cm/s    Left subclavian prox .0 cm/s    Left subclavian prox EDV 0.0 cm/s    Left vertebral sys 102.0 cm/s    LEFT VERTEBRAL ARTERY D 19.90 cm/s    Right cca dist sys 116.0 cm/s    Right CCA dist wayne 30.6 cm/s    Right CCA prox sys 106.0 cm/s    Right CCA prox wayne 15.3 cm/s    Right ICA dist sys 153.0 cm/s    Right ICA dist wayne 20.9 cm/s    Right ICA prox sys 198.0 cm/s    Right ICA prox wayne 34.4 cm/s    Right eca sys 298.0 cm/s    RIGHT EXTERNAL CAROTID ARTERY D 22.90 cm/s    Right subclavian prox .0 cm/s    Right subclavian prox EDV 0.0 cm/s    Right vertebral sys 74.1 cm/s    RIGHT VERTEBRAL ARTERY D 12.40 cm/s    Left arm  mmHg    Right arm  mmHg    Right ICA/CCA sys 1.71     Left ICA/CCA sys 1.31        DUPLEX CAROTID BILATERAL         XR CHEST PORT   Final Result   No acute findings. CTA CODE NEURO HEAD AND NECK W CONT   Final Result      1. No large vessel occlusion. Intracranial atherosclerosis without high-grade   intracranial stenosis. 2. No hemodynamically significant stenosis or cervical carotid or vertebral   arteries. All measurements are based on NASCET-like criteria. The results were discussed with/ relayed to Vita Burdick RN Charge at the   completion of the performance of the examination. CT CODE NEURO HEAD WO CONTRAST   Final Result      Chronic findings. I called the report to Dr. Maggie Najera 4/1/2022 1:32 PM.           Assessment:  TIA with transient left hemiplegia and speech difficulty, now resolved     Chest pain with elevated troponin, concerning for acute coronary syndrome. Troponin has been trending down, then went up slightly last check     Hx of Stroke x3- last was 2006     Chronic Pain Syndrome-  Has TENS unit in place     History of mitral Valve Replacement due to Rheumatic Fever 2009     COPD exacerbation     Tobacco abuse     Essential HTN    Acute on likely chronic kidney disease, with interval worsening creatinine may be related to contrast dye from CTA      Plan:  Recheck troponin now  Continue aspirin and high intensity statin  Add low-dose beta-blocker  Add normal saline for 24 hours and recheck renal function  She will need cardiac catheterization versus nuclear stress test    Care Plan discussed with: Patient/Family    Disposition: Continued inpatient care    Total time spent with patient: 30 minutes.     Devang Morrow MD

## 2022-04-02 NOTE — PROGRESS NOTES
OT eval order received and acknowledged. Screen completed as pt is currently at baseline independent functional status for self care and functional transfers/mobility. Pt with no acute OT needs at this time thus will D/C from skilled OT services after screen. Recommend discharge to home w/ assist when medically appropriate. Please re-consult if pt status changes. Thank you.

## 2022-04-02 NOTE — PROGRESS NOTES
PT orders received, chart reviewed, patient approached for PT eval, patient reported and performed all mobility indep (bed mob, transfers and gait w/o any AD)No LOB or off balance noted. No skilled therapy required at this time. Please reorder therapy if anything changes. Thank you.

## 2022-04-02 NOTE — PROGRESS NOTES
Problem: Aspiration - Risk of  Goal: *Absence of aspiration  Outcome: Progressing Towards Goal     Problem: Patient Education: Go to Patient Education Activity  Goal: Patient/Family Education  Outcome: Progressing Towards Goal     Problem: Falls - Risk of  Goal: *Absence of Falls  Description: Document Zane Johnson Fall Risk and appropriate interventions in the flowsheet. Outcome: Progressing Towards Goal  Note: Fall Risk Interventions:            Medication Interventions: Patient to call before getting OOB                   Problem: Patient Education: Go to Patient Education Activity  Goal: Patient/Family Education  Outcome: Progressing Towards Goal     Problem: Patient Education: Go to Patient Education Activity  Goal: Patient/Family Education  Outcome: Progressing Towards Goal     Problem: Falls - Risk of  Goal: *Absence of Falls  Description: Document Zane Johnson Fall Risk and appropriate interventions in the flowsheet.   Outcome: Progressing Towards Goal  Note: Fall Risk Interventions:            Medication Interventions: Patient to call before getting OOB                   Problem: Patient Education: Go to Patient Education Activity  Goal: Patient/Family Education  Outcome: Progressing Towards Goal

## 2022-04-03 LAB
ALBUMIN SERPL-MCNC: 3.2 G/DL (ref 3.5–5)
ANION GAP SERPL CALC-SCNC: 6 MMOL/L (ref 5–15)
ATRIAL RATE: 86 BPM
BUN SERPL-MCNC: 16 MG/DL (ref 6–20)
BUN/CREAT SERPL: 10 (ref 12–20)
CA-I BLD-MCNC: 8.6 MG/DL (ref 8.5–10.1)
CALCULATED P AXIS, ECG09: 84 DEGREES
CALCULATED R AXIS, ECG10: 36 DEGREES
CALCULATED T AXIS, ECG11: 67 DEGREES
CHLORIDE SERPL-SCNC: 109 MMOL/L (ref 97–108)
CO2 SERPL-SCNC: 25 MMOL/L (ref 21–32)
CREAT SERPL-MCNC: 1.58 MG/DL (ref 0.55–1.02)
DIAGNOSIS, 93000: NORMAL
GLUCOSE SERPL-MCNC: 96 MG/DL (ref 65–100)
P-R INTERVAL, ECG05: 154 MS
PHOSPHATE SERPL-MCNC: 3.6 MG/DL (ref 2.6–4.7)
POTASSIUM SERPL-SCNC: 4.1 MMOL/L (ref 3.5–5.1)
Q-T INTERVAL, ECG07: 374 MS
QRS DURATION, ECG06: 82 MS
QTC CALCULATION (BEZET), ECG08: 447 MS
SODIUM SERPL-SCNC: 140 MMOL/L (ref 136–145)
VENTRICULAR RATE, ECG03: 86 BPM

## 2022-04-03 PROCEDURE — 36415 COLL VENOUS BLD VENIPUNCTURE: CPT

## 2022-04-03 PROCEDURE — 74011000250 HC RX REV CODE- 250: Performed by: HOSPITALIST

## 2022-04-03 PROCEDURE — 74011250637 HC RX REV CODE- 250/637: Performed by: INTERNAL MEDICINE

## 2022-04-03 PROCEDURE — 80069 RENAL FUNCTION PANEL: CPT

## 2022-04-03 PROCEDURE — 74011250637 HC RX REV CODE- 250/637: Performed by: HOSPITALIST

## 2022-04-03 PROCEDURE — 65270000029 HC RM PRIVATE

## 2022-04-03 PROCEDURE — 74011250636 HC RX REV CODE- 250/636: Performed by: INTERNAL MEDICINE

## 2022-04-03 RX ORDER — IPRATROPIUM BROMIDE AND ALBUTEROL SULFATE 2.5; .5 MG/3ML; MG/3ML
3 SOLUTION RESPIRATORY (INHALATION)
Status: DISCONTINUED | OUTPATIENT
Start: 2022-04-03 | End: 2022-04-04 | Stop reason: HOSPADM

## 2022-04-03 RX ORDER — IBUPROFEN 200 MG
1 TABLET ORAL DAILY
Status: DISCONTINUED | OUTPATIENT
Start: 2022-04-03 | End: 2022-04-04 | Stop reason: HOSPADM

## 2022-04-03 RX ORDER — LITHIUM CARBONATE 300 MG
300 TABLET ORAL EVERY 12 HOURS
Status: DISCONTINUED | OUTPATIENT
Start: 2022-04-03 | End: 2022-04-04 | Stop reason: HOSPADM

## 2022-04-03 RX ORDER — ALPRAZOLAM 0.25 MG/1
0.25 TABLET ORAL
Status: DISCONTINUED | OUTPATIENT
Start: 2022-04-03 | End: 2022-04-04 | Stop reason: HOSPADM

## 2022-04-03 RX ORDER — NAPROXEN 250 MG/1
250 TABLET ORAL ONCE
Status: COMPLETED | OUTPATIENT
Start: 2022-04-03 | End: 2022-04-03

## 2022-04-03 RX ADMIN — ACETAMINOPHEN 650 MG: 325 TABLET ORAL at 06:46

## 2022-04-03 RX ADMIN — MELATONIN TAB 5 MG 5 MG: 5 TAB at 20:03

## 2022-04-03 RX ADMIN — ENOXAPARIN SODIUM 40 MG: 40 INJECTION SUBCUTANEOUS at 14:50

## 2022-04-03 RX ADMIN — NAPROXEN 250 MG: 250 TABLET ORAL at 04:01

## 2022-04-03 RX ADMIN — LITHIUM CARBONATE 300 MG: 300 TABLET ORAL at 20:04

## 2022-04-03 RX ADMIN — ASPIRIN 81 MG 81 MG: 81 TABLET ORAL at 10:39

## 2022-04-03 RX ADMIN — ATORVASTATIN CALCIUM 40 MG: 40 TABLET, FILM COATED ORAL at 20:05

## 2022-04-03 RX ADMIN — ACETAMINOPHEN 650 MG: 325 TABLET ORAL at 20:03

## 2022-04-03 RX ADMIN — METOPROLOL TARTRATE 25 MG: 25 TABLET, FILM COATED ORAL at 20:03

## 2022-04-03 RX ADMIN — METOPROLOL TARTRATE 25 MG: 25 TABLET, FILM COATED ORAL at 10:39

## 2022-04-03 RX ADMIN — SODIUM CHLORIDE 100 ML/HR: 9 INJECTION, SOLUTION INTRAVENOUS at 03:15

## 2022-04-03 RX ADMIN — ALPRAZOLAM 0.25 MG: 0.25 TABLET ORAL at 10:47

## 2022-04-03 NOTE — PROGRESS NOTES
CARDIOLOGY PROGRESS NOTE    Patient Name: Shu Baca  : 1955  AGE: 73 y.o. Gender: female    Patient seen and examined. This is a patient who is followed for chest pain. She has intermittent chest pain still present. No other complaints reported.     Telemetry reviewed, there were no events noted in the past 24 hours. Sinus rhythm     Pertinent review of systems items noted above, all other systems are negative. Current medications reviewed. Physical Examination    Allergies   Allergen Reactions    Penicillins Unknown (comments)     Visit Vitals  BP (!) 161/77 (BP 1 Location: Right upper arm, BP Patient Position: At rest)   Pulse 69   Temp 97.8 °F (36.6 °C)   Resp 18   Ht 5' 7\" (1.702 m)   Wt 68 kg (150 lb)   SpO2 97%   Breastfeeding No   BMI 23.49 kg/m²         No apparent distress. Heart is regular, rate and rhythm. Normal S1, S2, no murmurs are appreciated. Lungs are clear bilaterally. Abdomen is soft, nontender, normal bowel sounds. Extremities have no edema. Labs reviewed: All lab results for the last 24 hours reviewed. IMPRESSION AND RECOMMENDATIONS:  1. Chest pain with troponin elevation  2. S/p Mitral valve repalcement  3. Prior history of heart failure - Normal LVEF on echocardiogram  4. Hypertension  5. Severe untreated SAMANTHA  6. TIA on presentation    Troponins essentially flat; however, due to elevation and chest pain would need inpt ischemic eval prior to discharge. Due to NEMESIO, which though improving, will order nuclear stress test for am  - NPO post midnight. Please do not hesitate to call if additional questions arise.     Edith King MD  4/3/2022

## 2022-04-03 NOTE — PROGRESS NOTES
Neurology Progress Note    Patient ID:  Charmayne Coffin  [id number]  73 y.o.  1955    Subjective: The patient is seen in follow-up for an episode of left sided weakness which resolved and she is having more issues with her mood disorder. She has been diagnosed with bipolar disorder      Patient is a 73 y.o. right handed  female with a history of strokes in the past and multiple other medical issues was admitted overnight through the ED where she presented with above-mentioned symptoms. Per patient there is how she had her previous strokes. She was at work in the fasting first was her inability to write and read what she was writing. She also could not comprehend the content and then noticed weakness of the left face arm and leg. She decided to go home and from there her son and daughter-in-law brought her to the hospital in their car. Per patient her arm was so weak that she could not even lift it. But now it has resolved. A CT scan of the head and a CTA of the head and neck done in the ED were unremarkable for any acute findings or stenotic disease.     Current Facility-Administered Medications   Medication Dose Route Frequency    ALPRAZolam (XANAX) tablet 0.25 mg  0.25 mg Oral TID PRN    nicotine (NICODERM CQ) 21 mg/24 hr patch 1 Patch  1 Patch TransDERmal DAILY    albuterol-ipratropium (DUO-NEB) 2.5 MG-0.5 MG/3 ML  3 mL Nebulization Q6H PRN    metoprolol tartrate (LOPRESSOR) tablet 25 mg  25 mg Oral Q12H    melatonin tablet 5 mg  5 mg Oral QHS PRN    lidocaine 4 % patch 1 Patch  1 Patch TransDERmal Q24H    acetaminophen (TYLENOL) tablet 650 mg  650 mg Oral Q4H PRN    Or    acetaminophen (TYLENOL) solution 650 mg  650 mg Per NG tube Q4H PRN    Or    acetaminophen (TYLENOL) suppository 650 mg  650 mg Rectal Q4H PRN    enoxaparin (LOVENOX) injection 40 mg  40 mg SubCUTAneous Q24H    aspirin chewable tablet 81 mg  81 mg Oral DAILY    atorvastatin (LIPITOR) tablet 40 mg  40 mg Oral QHS Objective:     Patient Vitals for the past 8 hrs:   BP Temp Pulse Resp SpO2   04/03/22 1523 (!) 160/77 98.2 °F (36.8 °C) 66 18 99 %     04/03 0701 - 04/03 1900  In: 240 [P.O.:240]  Out: 1   04/01 1901 - 04/03 0700  In: 942 [P.O.:942]  Out: 1100 [Urine:1100]    Lab Review   Recent Results (from the past 24 hour(s))   RENAL FUNCTION PANEL    Collection Time: 04/03/22  4:39 AM   Result Value Ref Range    Sodium 140 136 - 145 mmol/L    Potassium 4.1 3.5 - 5.1 mmol/L    Chloride 109 (H) 97 - 108 mmol/L    CO2 25 21 - 32 mmol/L    Anion gap 6 5 - 15 mmol/L    Glucose 96 65 - 100 mg/dL    BUN 16 6 - 20 mg/dL    Creatinine 1.58 (H) 0.55 - 1.02 mg/dL    BUN/Creatinine ratio 10 (L) 12 - 20      GFR est AA 40 (L) >60 ml/min/1.73m2    GFR est non-AA 33 (L) >60 ml/min/1.73m2    Calcium 8.6 8.5 - 10.1 mg/dL    Phosphorus 3.6 2.6 - 4.7 mg/dL    Albumin 3.2 (L) 3.5 - 5.0 g/dL     Additional comments: Echocardiogram: EF 60 to 65% and no shunt recorded. NEUROLOGICAL EXAM:  Appearance: The patient is well developed, well nourished, provides a coherent history and is in no acute distress. Mental Status: Oriented to time, place and person. Mood and affect appropriate. Cranial Nerves:   Intact visual fields. CALEB, EOM's full, no nystagmus Facial movement is symmetric. Hearing is normal bilaterally. Motor:  5/5 strength in upper and lower proximal and distal muscles. Normal bulk and tone. No fasciculations. Reflexes:   Deep tendon reflexes 2+/4 and symmetrical.   Sensory:   Normal to touch, pinprick and vibration. Gait:  Normal gait. Tremor:   No tremor noted. Cerebellar:  No cerebellar signs present. Neurovascular:  Normal heart sounds and regular rhythm, peripheral pulses intact, and no carotid bruits. Assessment:   1. TIA versus stroke: The patient's symptoms resolved by the time she was in the hospital.  2D echo shows EF of 60 to 65%. Continue aspirin and atorvastatin. Her CT and CTA are unremarkable. Doing an MRI is not going to change the management even if we see a small lacunar infarct. The management will still be aspirin 81 mg a day since she had not been on any antiplatelets prior to the stroke, as such I will not order the MRI. She can be discharged on aspirin 81 mg once a day and atorvastatin. 2.  History of strokes in the past x3.  3.  Chest pain with elevated troponin, possible acute coronary syndrome  4. Chronic pain syndrome currently on TENS unit  5. Essential hypertension. 6.  History of mitral valve replacement due to rheumatic fever. 7.  Tobacco dependence syndrome: Patient needs counseling to quit smoking  8. Bipolar disorder: She takes lithium 300 mg twice a day twice a day but none seen here    Plan:   1. Since the work-up is negative she can be discharged from neurology standpoint aspirin 81 mg a day and atorvastatin 40 mg at night and follow-up in the clinic if needed. I will sign off the case for now, however, please do not hesitate to call if needed again.     Thank you,    Signed:  Pascual Woods MD  4/3/2022  4:26 PM

## 2022-04-03 NOTE — PROGRESS NOTES
Hospitalist Progress Note               Daily Progress Note: 4/3/2022      Subjective: The patient is seen for follow up. Neena Vergara is a 73 y.o. female with a PMH of chronic pain syndrome, 2 back fusions, a mitral valve replacement, hip replacement, COPD, emphysema, HTN, and Hx of 3 strokes (last 16 years ago) presents to the ED with slurred speech, left sided weakness, and inability to write. Patient states that she was at work doing paperwork when she felt left arm weakness and agraphia. She states she could not articulate any words at the time. She states that her stroke 16 years ago presented with similar symptoms. Patient did not have slurred speech or weakness upon examination. She said that she had pneumonia 2 weeks ago and has been on antibiotics and a steroid since. Patient has smoked 1 ppd for 57 years. She denies any alcohol use. 4/1 troponin (165), creatinine (1.68). No previous creatinine available     CTA head showed no large vessel occlusion. Intracranial atherosclerosis without high grade intracranial stenosis.     At the very end of my interview with patient she mentioned as an aside that she has been having some intermittent chest tightness the past several days. She never mentioned this to anybody else because she did not think it meant anything     Reports she was told not to take aspirin after her previous stroke because it was a bleed    Echo showed normal LV function    Carotid Doppler studies are unremarkable    ------------    Patient was seen resting in bed eager for discharge. She states she has aches all over her body due to laying in hospital bed.     Kidney function is improving (Cr down to 1.58)    Problem List:  Problem List as of 4/3/2022 Never Reviewed          Codes Class Noted - Resolved    CVA (cerebrovascular accident) [facility]) ICD-10-CM: I63.9  ICD-9-CM: 434.91  4/1/2022 - Present              Medications reviewed  Current Facility-Administered Medications Medication Dose Route Frequency    metoprolol tartrate (LOPRESSOR) tablet 25 mg  25 mg Oral Q12H    melatonin tablet 5 mg  5 mg Oral QHS PRN    lidocaine 4 % patch 1 Patch  1 Patch TransDERmal Q24H    acetaminophen (TYLENOL) tablet 650 mg  650 mg Oral Q4H PRN    Or    acetaminophen (TYLENOL) solution 650 mg  650 mg Per NG tube Q4H PRN    Or    acetaminophen (TYLENOL) suppository 650 mg  650 mg Rectal Q4H PRN    enoxaparin (LOVENOX) injection 40 mg  40 mg SubCUTAneous Q24H    aspirin chewable tablet 81 mg  81 mg Oral DAILY    atorvastatin (LIPITOR) tablet 40 mg  40 mg Oral QHS    0.9% sodium chloride infusion  100 mL/hr IntraVENous CONTINUOUS       Review of Systems:   A comprehensive review of systems was negative except for that written in the HPI. Objective:   Physical Exam:     Visit Vitals  BP (!) 161/77 (BP 1 Location: Right upper arm, BP Patient Position: At rest)   Pulse 69   Temp 97.8 °F (36.6 °C)   Resp 18   Ht 5' 7\" (1.702 m)   Wt 150 lb (68 kg)   SpO2 97%   Breastfeeding No   BMI 23.49 kg/m²      O2 Device: None (Room air)    Temp (24hrs), Av.1 °F (36.7 °C), Min:97 °F (36.1 °C), Max:99.1 °F (37.3 °C)    No intake/output data recorded.  1901 -  0700  In: 942 [P.O.:942]  Out: 1100 [Urine:1100]    General:   Awake and alert   Lungs:   Clear to auscultation bilaterally. Chest wall:  No tenderness or deformity. Heart:  Regular rate and rhythm, S1, S2 normal, no murmur, click, rub or gallop. Abdomen:   Soft, non-tender. Bowel sounds normal. No masses,  No organomegaly. Extremities: Extremities normal, atraumatic, no cyanosis or edema. Pulses: 2+ and symmetric all extremities. Skin: Skin color, texture, turgor normal. No rashes or lesions   Neurologic: CNII-XII intact.   No gross focal deficits         Data Review:       Recent Days:  Recent Labs     22  1349   WBC 11.8*   HGB 11.1*   HCT 36.3        Recent Labs     22  0439 22  0140 04/01/22  1349    135* 134*   K 4.1 3.8 3.8   * 102 99   CO2 25 28 31   GLU 96 117* 97   BUN 16 20 16   CREA 1.58* 1.82* 1.68*   CA 8.6 8.9 9.0   PHOS 3.6  --   --    ALB 3.2*  --  3.5   TBILI  --   --  0.3   ALT  --   --  25   INR  --   --  1.0     No results for input(s): PH, PCO2, PO2, HCO3, FIO2 in the last 72 hours.     24 Hour Results:  Recent Results (from the past 24 hour(s))   ECHO ADULT COMPLETE    Collection Time: 04/02/22  9:19 AM   Result Value Ref Range    LV EDV A2C 49 mL    LV EDV A4C 22 mL    LV ESV A2C 10 mL    LV ESV A4C 9 mL    IVSd 1.0 (A) 0.6 - 0.9 cm    LVIDd 3.6 (A) 3.9 - 5.3 cm    LVIDs 2.2 cm    LVOT Diameter 2.1 cm    LVOT Mean Gradient 3 mmHg    LVOT VTI 23.3 cm    LVOT Peak Velocity 1.1 m/s    LVOT Peak Gradient 5 mmHg    LVPWd 0.8 0.6 - 0.9 cm    LV E' Lateral Velocity 8 cm/s    LV E' Septal Velocity 11 cm/s    LV Ejection Fraction A2C 80 %    LV Ejection Fraction A4C 59 %    LVOT Area 3.5 cm2    LVOT SV 80.7 ml    LA Minor Axis 4.1 cm    LA Major [place] 3.2 cm    LA Area 2C 10.3 cm2    LA Area 4C 6.8 cm2    LA Diameter 3.6 cm    AV Mean Gradient 8 mmHg    AV VTI 42.4 cm    AV Mean Velocity 1.3 m/s    AV Peak Velocity 1.8 m/s    AV Peak Gradient 13 mmHg    AV Area by VTI 1.9 cm2    AV Area by Peak Velocity 2.1 cm2    Aortic Root 3.0 cm    Ascending Aorta 2.6 cm    MR VTI 44.3 cm    MV Mean Gradient 5 mmHg    MV VTI 46.9 cm    MV Mean Velocity 1.0 m/s    MR Peak Velocity 1.7 m/s    MR Peak Gradient 12 mmHg    MV Max Velocity 1.8 m/s    MV Peak Gradient 13 mmHg    MV A Velocity 1.49 m/s    MV E Velocity 1.75 m/s    MV Area by VTI 1.7 cm2    PV Mean Gradient 1 mmHg    PV VTI 13.8 cm    PV Mean Velocity 0.5 m/s    PV Max Velocity 0.7 m/s    PV Peak Gradient 2 mmHg    TR Max Velocity 2.14 m/s    TR Peak Gradient 18 mmHg    TAPSE 1.6 1.5 - 2.0 cm    Fractional Shortening 2D 39 28 - 44 %    LV ESV Index A4C 5 mL/m2    LV EDV Index A4C 12 mL/m2    LV ESV Index A2C 6 mL/m2    LV EDV Index A2C 27 mL/m2    LVIDd Index 2.01 cm/m2    LVIDs Index 1.23 cm/m2    LV RWT Ratio 0.44     LV Mass 2D 92.8 67 - 162 g    LV Mass 2D Index 51.8 43 - 95 g/m2    MV E/A 1.17     E/E' Ratio (Averaged) 18.89     E/E' Lateral 21.88     E/E' Septal 15.91     LVOT Stroke Volume Index 45.1 mL/m2    LA Size Index 2.01 cm/m2    LA/AO Root Ratio 1.20     Ao Root Index 1.68 cm/m2    Ascending Aorta Index 1.45 cm/m2    AV Velocity Ratio 0.61     LVOT:AV VTI Index 0.55     BARTOLOME/BSA VTI 1.1 cm2/m2    BARTOLOME/BSA Peak Velocity 1.2 cm2/m2    MV:LVOT VTI Index 2.01     RV Basal Dimension 3.5 cm    RV Mid Dimension 2.2 cm    Est. RA Pressure 3 mmHg    RVSP 21 mmHg   DUPLEX CAROTID BILATERAL    Collection Time: 04/02/22  9:36 AM   Result Value Ref Range    Left CCA dist sys 96.2 cm/s    Left CCA dist [place] 17.2 cm/s    Left CCA prox sys 107.0 cm/s    Left CCA prox [place] 18.9 cm/s    Left ICA dist sys 159.0 cm/s    Left ICA dist way[place] 39.7 cm/s    Left ICA prox sys 126.0 cm/s    Left ICA prox wayne 28.6 cm/s    Left ECA sys 146.0 cm/s    LEFT EXTERNAL CAROTID ARTERY D 0.00 cm/s    Left subclavian prox .0 cm/s    Left subclavian prox EDV 0.0 cm/s    Left vertebral sys 102.0 cm/s    LEFT VERTEBRAL ARTERY D 19.90 cm/s    Right cca dist sys 116.0 cm/s    Right CCA dist [place] 30.6 cm/s    Right CCA prox sys 106.0 cm/s    Right CCA prox [place] 15.3 cm/s    Right ICA dist sys 153.0 cm/s    Right ICA dist wayne 20.9 cm/s    Right ICA prox sys 198.0 cm/s    Right ICA prox way[place] 34.4 cm/s    Right eca sys 298.0 cm/s    RIGHT EXTERNAL CAROTID ARTERY D 22.90 cm/s    Right subclavian prox .0 cm/s    Right subclavian prox EDV 0.0 cm/s    Right vertebral sys 74.1 cm/s    RIGHT VERTEBRAL ARTERY D 12.40 cm/s    Left arm  mmHg    Right arm  mmHg    Right ICA/CCA sys 1.71     Left ICA/CCA sys 1.31    TROPONIN-HIGH SENSITIVITY    Collection Time: 04/02/22  1:24 PM   Result Value Ref Range    Troponin-High Sensitivity 139 (HH) 0 - 51 ng/L RENAL FUNCTION PANEL    Collection Time: 04/03/22  4:39 AM   Result Value Ref Range    Sodium 140 136 - 145 mmol/L    Potassium 4.1 3.5 - 5.1 mmol/L    Chloride 109 (H) 97 - 108 mmol/L    CO2 25 21 - 32 mmol/L    Anion gap 6 5 - 15 mmol/L    Glucose 96 65 - 100 mg/dL    BUN 16 6 - 20 mg/dL    Creatinine 1.58 (H) 0.55 - 1.02 mg/dL    BUN/Creatinine ratio 10 (L) 12 - 20      GFR est AA 40 (L) >60 ml/min/1.73m2    GFR est non-AA 33 (L) >60 ml/min/1.73m2    Calcium 8.6 8.5 - 10.1 mg/dL    Phosphorus 3.6 2.6 - 4.7 mg/dL    Albumin 3.2 (L) 3.5 - 5.0 g/dL       DUPLEX CAROTID BILATERAL   Final Result      XR CHEST PORT   Final Result   No acute findings. CTA CODE NEURO HEAD AND NECK W CONT   Final Result      1. No large vessel occlusion. Intracranial atherosclerosis without high-grade   intracranial stenosis. 2. No hemodynamically significant stenosis or cervical carotid or vertebral   arteries. All measurements are based on NASCET-like criteria. The results were discussed with/ relayed to Francie Olmos RN Charge at the   completion of the performance of the examination. CT CODE NEURO HEAD WO CONTRAST   Final Result      Chronic findings. I called the report to Dr. Neli Courtney 4/1/2022 1:32 PM.           Assessment:  TIA with transient left hemiplegia and speech difficulty, now resolved     Chest pain with elevated troponin, concerning for acute coronary syndrome.   Troponin has been trending down, then went up slightly last check     Hx of Stroke x3- last was 2006     Chronic Pain Syndrome-  Has TENS unit in place     History of mitral Valve Replacement due to Rheumatic Fever 2009     COPD exacerbation     Tobacco abuse     Essential HTN    Acute on likely chronic kidney disease, with interval worsening creatinine may be related to contrast dye from CTA      Plan:  Continue aspirin and high intensity statin  She will need cardiac catheterization versus nuclear stress test  Continue to trend troponins      Yahaira Jonathany

## 2022-04-03 NOTE — PROGRESS NOTES
Problem: Aspiration - Risk of  Goal: *Absence of aspiration  Outcome: Progressing Towards Goal     Problem: Patient Education: Go to Patient Education Activity  Goal: Patient/Family Education  Outcome: Progressing Towards Goal     Problem: Falls - Risk of  Goal: *Absence of Falls  Description: Document Star Back Fall Risk and appropriate interventions in the flowsheet. Outcome: Progressing Towards Goal  Note: Fall Risk Interventions:            Medication Interventions: Teach patient to arise slowly                   Problem: Patient Education: Go to Patient Education Activity  Goal: Patient/Family Education  Outcome: Progressing Towards Goal     Problem: Falls - Risk of  Goal: *Absence of Falls  Description: Document Star Back Fall Risk and appropriate interventions in the flowsheet.   Outcome: Progressing Towards Goal  Note: Fall Risk Interventions:            Medication Interventions: Teach patient to arise slowly                   Problem: Patient Education: Go to Patient Education Activity  Goal: Patient/Family Education  Outcome: Progressing Towards Goal     Problem: Patient Education: Go to Patient Education Activity  Goal: Patient/Family Education  Outcome: Progressing Towards Goal

## 2022-04-03 NOTE — PROGRESS NOTES
Hospitalist Progress Note               Daily Progress Note: 4/3/2022      Subjective: The patient is seen for follow up. Lorene Lewis is a 73 y.o. female with a PMH of chronic pain syndrome, 2 back fusions, a mitral valve replacement, hip replacement, COPD, emphysema, HTN, and Hx of 3 strokes (last 16 years ago) presents to the ED with slurred speech, left sided weakness, and inability to write. Patient states that she was at work doing paperwork when she felt left arm weakness and agraphia. She states she could not articulate any words at the time. She states that her stroke 16 years ago presented with similar symptoms. Patient did not have slurred speech or weakness upon examination. She said that she had pneumonia 2 weeks ago and has been on antibiotics and a steroid since. Patient has smoked 1 ppd for 57 years. She denies any alcohol use. 4/1 troponin (165), creatinine (1.68). No previous creatinine available     CTA head showed no large vessel occlusion. Intracranial atherosclerosis without high grade intracranial stenosis.     At the very end of my interview with patient she mentioned as an aside that she has been having some intermittent chest tightness the past several days. She never mentioned this to anybody else because she did not think it meant anything     Reports she was told not to take aspirin after her previous stroke because it was a bleed    Echo showed normal LV function    Carotid Doppler studies are unremarkable    ------------    Patient was seen resting in bed eager for discharge. She states she has aches all over her body due to laying in hospital bed.     Kidney function is improving (Cr down to 1.58)    Her  indicates that she does have chronic kidney disease although baseline is unknown    Patient desperately wants to go home but has agreed to stay till tomorrow for further testing    Problem List:  Problem List as of 4/3/2022 Never Reviewed          Codes Class Noted - Resolved    CVA (cerebrovascular accident) [facility]) ICD-10-CM: I63.9  ICD-9-CM: 434.91  2022 - Present              Medications reviewed  Current Facility-Administered Medications   Medication Dose Route Frequency    metoprolol tartrate (LOPRESSOR) tablet 25 mg  25 mg Oral Q12H    melatonin tablet 5 mg  5 mg Oral QHS PRN    lidocaine 4 % patch 1 Patch  1 Patch TransDERmal Q24H    acetaminophen (TYLENOL) tablet 650 mg  650 mg Oral Q4H PRN    Or    acetaminophen (TYLENOL) solution 650 mg  650 mg Per NG tube Q4H PRN    Or    acetaminophen (TYLENOL) suppository 650 mg  650 mg Rectal Q4H PRN    enoxaparin (LOVENOX) injection 40 mg  40 mg SubCUTAneous Q24H    aspirin chewable tablet 81 mg  81 mg Oral DAILY    atorvastatin (LIPITOR) tablet 40 mg  40 mg Oral QHS    0.9% sodium chloride infusion  100 mL/hr IntraVENous CONTINUOUS       Review of Systems:   A comprehensive review of systems was negative except for that written in the HPI. Objective:   Physical Exam:     Visit Vitals  BP (!) 161/77 (BP 1 Location: Right upper arm, BP Patient Position: At rest)   Pulse 69   Temp 97.8 °F (36.6 °C)   Resp 18   Ht 5' 7\" (1.702 m)   Wt 68 kg (150 lb)   SpO2 97%   Breastfeeding No   BMI 23.49 kg/m²      O2 Device: None (Room air)    Temp (24hrs), Av.1 °F (36.7 °C), Min:97 °F (36.1 °C), Max:99.1 °F (37.3 °C)    No intake/output data recorded.  1901 -  0700  In: 942 [P.O.:942]  Out: 1100 [Urine:1100]    General:   Awake and alert   Lungs:   Clear to auscultation bilaterally. Chest wall:  No tenderness or deformity. Heart:  Regular rate and rhythm, S1, S2 normal, no murmur, click, rub or gallop. Abdomen:   Soft, non-tender. Bowel sounds normal. No masses,  No organomegaly. Extremities: Extremities normal, atraumatic, no cyanosis or edema. Pulses: 2+ and symmetric all extremities. Skin: Skin color, texture, turgor normal. No rashes or lesions   Neurologic: CNII-XII intact.   No gross focal deficits         Data Review:       Recent Days:  Recent Labs     04/01/22  1349   WBC 11.8*   HGB 11.1*   HCT 36.3        Recent Labs     04/03/22  0439 04/02/22  0140 04/01/22  1349    135* 134*   K 4.1 3.8 3.8   * 102 99   CO2 25 28 31   GLU 96 117* 97   BUN 16 20 16   CREA 1.58* 1.82* 1.68*   CA 8.6 8.9 9.0   PHOS 3.6  --   --    ALB 3.2*  --  3.5   TBILI  --   --  0.3   ALT  --   --  25   INR  --   --  1.0     No results for input(s): PH, PCO2, PO2, HCO3, FIO2 in the last 72 hours. 24 Hour Results:  Recent Results (from the past 24 hour(s))   TROPONIN-HIGH SENSITIVITY    Collection Time: 04/02/22  1:24 PM   Result Value Ref Range    Troponin-High Sensitivity 139 (HH) 0 - 51 ng/L   RENAL FUNCTION PANEL    Collection Time: 04/03/22  4:39 AM   Result Value Ref Range    Sodium 140 136 - 145 mmol/L    Potassium 4.1 3.5 - 5.1 mmol/L    Chloride 109 (H) 97 - 108 mmol/L    CO2 25 21 - 32 mmol/L    Anion gap 6 5 - 15 mmol/L    Glucose 96 65 - 100 mg/dL    BUN 16 6 - 20 mg/dL    Creatinine 1.58 (H) 0.55 - 1.02 mg/dL    BUN/Creatinine ratio 10 (L) 12 - 20      GFR est AA 40 (L) >60 ml/min/1.73m2    GFR est non-AA 33 (L) >60 ml/min/1.73m2    Calcium 8.6 8.5 - 10.1 mg/dL    Phosphorus 3.6 2.6 - 4.7 mg/dL    Albumin 3.2 (L) 3.5 - 5.0 g/dL       DUPLEX CAROTID BILATERAL   Final Result      XR CHEST PORT   Final Result   No acute findings. CTA CODE NEURO HEAD AND NECK W CONT   Final Result      1. No large vessel occlusion. Intracranial atherosclerosis without high-grade   intracranial stenosis. 2. No hemodynamically significant stenosis or cervical carotid or vertebral   arteries. All measurements are based on NASCET-like criteria. The results were discussed with/ relayed to Mame Miller RN Charge at the   completion of the performance of the examination. CT CODE NEURO HEAD WO CONTRAST   Final Result      Chronic findings.       I called the report to Dr. Anthony Juan 4/1/2022 1:32 PM. Assessment:  TIA with transient left hemiplegia and speech difficulty, now resolved     Chest pain with elevated troponin, concerning for acute coronary syndrome.   Troponin now trending down     Hx of Stroke x3- last was 2006     Chronic Pain Syndrome-  Has TENS unit in place     History of mitral Valve Replacement due to Rheumatic Fever 2009     COPD exacerbation     Tobacco abuse     Essential HTN    Acute on likely chronic kidney disease, now improved    Plan:  Continue aspirin and high intensity statin  She will need cardiac catheterization versus nuclear stress test.  In light of her CKD and troponin trending down, nuclear stress test may be the better option        Gely Olivas MD

## 2022-04-04 ENCOUNTER — APPOINTMENT (OUTPATIENT)
Dept: NUCLEAR MEDICINE | Age: 67
DRG: 069 | End: 2022-04-04
Attending: INTERNAL MEDICINE
Payer: MEDICARE

## 2022-04-04 ENCOUNTER — APPOINTMENT (OUTPATIENT)
Dept: NON INVASIVE DIAGNOSTICS | Age: 67
DRG: 069 | End: 2022-04-04
Attending: INTERNAL MEDICINE
Payer: MEDICARE

## 2022-04-04 VITALS
SYSTOLIC BLOOD PRESSURE: 171 MMHG | RESPIRATION RATE: 16 BRPM | OXYGEN SATURATION: 95 % | HEART RATE: 70 BPM | DIASTOLIC BLOOD PRESSURE: 89 MMHG | HEIGHT: 67 IN | TEMPERATURE: 98.3 F | WEIGHT: 150 LBS | BODY MASS INDEX: 23.54 KG/M2

## 2022-04-04 LAB
ATRIAL RATE: 88 BPM
CALCULATED P AXIS, ECG09: 83 DEGREES
CALCULATED R AXIS, ECG10: 46 DEGREES
CALCULATED T AXIS, ECG11: 78 DEGREES
DIAGNOSIS, 93000: NORMAL
NUC STRESS EJECTION FRACTION: 75 %
P-R INTERVAL, ECG05: 124 MS
Q-T INTERVAL, ECG07: 390 MS
QRS DURATION, ECG06: 78 MS
QTC CALCULATION (BEZET), ECG08: 471 MS
STRESS BASELINE DIAS BP: 89 MMHG
STRESS BASELINE HR: 67 BPM
STRESS BASELINE ST DEPRESSION: 0 MM
STRESS BASELINE SYS BP: 171 MMHG
STRESS PERCENT HR ACHIEVED: 85 %
STRESS POST PEAK HR: 132 BPM
STRESS ST DEPRESSION: 0 MM
STRESS STAGE 1 DURATION: NORMAL MIN:SEC
STRESS STAGE 1 HR: 88 BPM
STRESS STAGE 2 BP: NORMAL MMHG
STRESS STAGE 2 DURATION: NORMAL MIN:SEC
STRESS STAGE 2 HR: 83 BPM
STRESS STAGE 3 DURATION: NORMAL MIN:SEC
STRESS STAGE 3 HR: 83 BPM
STRESS STAGE 4 BP: NORMAL MMHG
STRESS STAGE 4 DURATION: NORMAL MIN:SEC
STRESS STAGE 4 HR: 82 BPM
STRESS TARGET HR: 155 BPM
VENTRICULAR RATE, ECG03: 88 BPM

## 2022-04-04 PROCEDURE — 74011250636 HC RX REV CODE- 250/636

## 2022-04-04 PROCEDURE — A9500 TC99M SESTAMIBI: HCPCS

## 2022-04-04 PROCEDURE — 74011250637 HC RX REV CODE- 250/637: Performed by: INTERNAL MEDICINE

## 2022-04-04 RX ORDER — GUAIFENESIN 100 MG/5ML
81 LIQUID (ML) ORAL DAILY
Qty: 30 TABLET | Refills: 0 | Status: SHIPPED | OUTPATIENT
Start: 2022-04-05

## 2022-04-04 RX ORDER — IPRATROPIUM BROMIDE AND ALBUTEROL SULFATE 2.5; .5 MG/3ML; MG/3ML
3 SOLUTION RESPIRATORY (INHALATION)
Qty: 120 NEBULE | Refills: 0 | Status: SHIPPED | OUTPATIENT
Start: 2022-04-04

## 2022-04-04 RX ORDER — METOPROLOL TARTRATE 25 MG/1
25 TABLET, FILM COATED ORAL EVERY 12 HOURS
Qty: 60 TABLET | Refills: 0 | Status: SHIPPED | OUTPATIENT
Start: 2022-04-04

## 2022-04-04 RX ORDER — ATORVASTATIN CALCIUM 40 MG/1
40 TABLET, FILM COATED ORAL
Qty: 30 TABLET | Refills: 0 | Status: SHIPPED | OUTPATIENT
Start: 2022-04-04

## 2022-04-04 RX ORDER — NEBULIZER AND COMPRESSOR
1 EACH MISCELLANEOUS 4 TIMES DAILY
Qty: 1 EACH | Refills: 0 | Status: SHIPPED | OUTPATIENT
Start: 2022-04-04

## 2022-04-04 RX ORDER — BUDESONIDE AND FORMOTEROL FUMARATE DIHYDRATE 80; 4.5 UG/1; UG/1
2 AEROSOL RESPIRATORY (INHALATION) 2 TIMES DAILY
Qty: 10.2 G | Refills: 0 | Status: SHIPPED | OUTPATIENT
Start: 2022-04-04

## 2022-04-04 RX ADMIN — LITHIUM CARBONATE 300 MG: 300 TABLET ORAL at 11:26

## 2022-04-04 RX ADMIN — REGADENOSON 0.4 MG: 0.08 INJECTION, SOLUTION INTRAVENOUS at 09:34

## 2022-04-04 RX ADMIN — ASPIRIN 81 MG 81 MG: 81 TABLET ORAL at 11:26

## 2022-04-04 RX ADMIN — METOPROLOL TARTRATE 25 MG: 25 TABLET, FILM COATED ORAL at 11:26

## 2022-04-04 NOTE — PROGRESS NOTES
Patient has to call Dr. Charline Ghotra office for appointment because they do not make appointments with no one but patient or family.

## 2022-04-04 NOTE — DISCHARGE SUMMARY
Physician Discharge Summary     Patient ID:    Brigitte Saez  [id number]  73 y.o.  1955    Admit date: 4/1/2022    Discharge date : 4/4/2022    Chronic Diagnoses:    Problem List as of 4/4/2022 Never Reviewed          Codes Class Noted - Resolved    CVA (cerebrovascular accident) [facility]) ICD-10-CM: I63.9  ICD-9-CM: 434.91  4/1/2022 - Present          22    Final Diagnoses:   Menaalee Le transient left hemiplegia and speech difficulty, now resolved     Chest pain with elevated troponin. Nuclear stress test negative for ischemia     Hx of Stroke x3- last was 2006     Chronic Pain Syndrome-  Has TENS unit in place     History of mitral Valve Replacement due to Rheumatic Fever 2009     COPD exacerbation     Tobacco abuse     Essential HTN     Acute on  chronic kidney disease    Reason for Hospitalization:  Leana Das is a 73 y. o. female with a PMH of chronic pain syndrome, 2 back fusions, a mitral valve replacement, hip replacement, COPD, emphysema, HTN, and Hx of 3 strokes (last 16 years ago) presents to the ED with slurred speech, left sided weakness, and inability to write. Patient states that she was at work doing paperwork when she felt left arm weakness and agraphia. She states she could not articulate any words at the time. She states that her stroke 16 years ago presented with similar symptoms. Patient did not have slurred speech or weakness upon examination. She said that she had pneumonia 2 weeks ago and has been on antibiotics and a steroid since. Patient has smoked 1 ppd for 57 years. She denies any alcohol use.     4/1 troponin (165), creatinine (1.68).  No previous creatinine available    Patient also mentioned recent episodes of chest pain. MRI was precluded due to presence of a TENS stimulator      Hospital Course:   Patient was admitted to cardiac telemetry. She is started on baby aspirin daily along with high intensity statin.     Echocardiogram showed normal left ventricular function. Carotid duplex studies were unremarkable. Her creatinine initially worsened up to 1.8, then improved down to 1.5    Her troponin peaked at around 150, then trended down. She was seen by cardiology who recommended ischemic work-up with a nuclear stress test    She underwent a nuclear stress test on 4/4 which was negative for ischemia    Patient was felt stable for discharge home on 4/4            Discharge Medications:   Current Discharge Medication List      START taking these medications    Details   metoprolol tartrate (LOPRESSOR) 25 mg tablet Take 1 Tablet by mouth every twelve (12) hours. Qty: 60 Tablet, Refills: 0  Start date: 4/4/2022      albuterol-ipratropium (DUO-NEB) 2.5 mg-0.5 mg/3 ml nebu 3 mL by Nebulization route every six (6) hours as needed for Wheezing. Qty: 120 Nebule, Refills: 0  Start date: 4/4/2022      Nebulizer & Compressor machine 1 Each by Does Not Apply route four (4) times daily. Qty: 1 Each, Refills: 0  Start date: 4/4/2022      budesonide-formoteroL (Symbicort) 80-4.5 mcg/actuation HFAA Take 2 Puffs by inhalation two (2) times a day. Qty: 10.2 g, Refills: 0  Start date: 4/4/2022      aspirin 81 mg chewable tablet Take 1 Tablet by mouth daily. Qty: 30 Tablet, Refills: 0  Start date: 4/5/2022      atorvastatin (LIPITOR) 40 mg tablet Take 1 Tablet by mouth nightly. Qty: 30 Tablet, Refills: 0  Start date: 4/4/2022         CONTINUE these medications which have NOT CHANGED    Details   losartan (COZAAR) 50 mg tablet Take 50 mg by mouth daily. lithium carbonate 300 mg capsule Take 300 mg by mouth two (2) times a day. Follow up Care:    1. Tiffany Roper MD in 1-2 weeks. Please call to set up an appointment shortly after discharge. Diet:  Cardiac Diet    Disposition:  Home. Advanced Directive:   FULL    DNR      Discharge Exam:  General:  Alert, cooperative, no distress, appears stated age.    Lungs:   Clear to auscultation bilaterally. Chest wall:  No tenderness or deformity. Heart:  Regular rate and rhythm, S1, S2 normal, no murmur, click, rub or gallop. Abdomen:   Soft, non-tender. Bowel sounds normal. No masses,  No organomegaly. Extremities: Extremities normal, atraumatic, no cyanosis or edema. Pulses: 2+ and symmetric all extremities. Skin: Skin color, texture, turgor normal. No rashes or lesions   Neurologic: CNII-XII intact. No gross sensory or motor deficits        CONSULTATIONS: Neurology and cardiology    Significant Diagnostic Studies:   4/1/2022: BUN 16 mg/dL (Ref range: 6 - 20 mg/dL); Calcium 9.0 mg/dL (Ref range: 8.5 - 10.1 mg/dL); CO2 31 mmol/L (Ref range: 21 - 32 mmol/L); Creatinine 1.68 mg/dL (H; Ref range: 0.55 - 1.02 mg/dL); Glucose 97 mg/dL (Ref range: 65 - 100 mg/dL); HCT 36.3 % (Ref range: 35.0 - 47.0 %); HGB 11.1 g/dL (L; Ref range: 11.5 - 16.0 g/dL); Potassium 3.8 mmol/L (Ref range: 3.5 - 5.1 mmol/L); Sodium 134 mmol/L (L; Ref range: 136 - 145 mmol/L)  4/2/2022: BUN 20 mg/dL (Ref range: 6 - 20 mg/dL); Calcium 8.9 mg/dL (Ref range: 8.5 - 10.1 mg/dL); CO2 28 mmol/L (Ref range: 21 - 32 mmol/L); Creatinine 1.82 mg/dL (H; Ref range: 0.55 - 1.02 mg/dL); Glucose 117 mg/dL (H; Ref range: 65 - 100 mg/dL); Potassium 3.8 mmol/L (Ref range: 3.5 - 5.1 mmol/L); Sodium 135 mmol/L (L; Ref range: 136 - 145 mmol/L)  Recent Labs     04/01/22  1349   WBC 11.8*   HGB 11.1*   HCT 36.3        Recent Labs     04/03/22  0439 04/02/22  0140 04/01/22  1349    135* 134*   K 4.1 3.8 3.8   * 102 99   CO2 25 28 31   BUN 16 20 16   CREA 1.58* 1.82* 1.68*   GLU 96 117* 97   CA 8.6 8.9 9.0   PHOS 3.6  --   --      Recent Labs     04/03/22  0439 04/01/22  1349   ALT  --  25   AP  --  130*   TBILI  --  0.3   TP  --  6.6   ALB 3.2* 3.5   GLOB  --  3.1     Recent Labs     04/01/22  1349   INR 1.0   PTP 13.3   APTT 32.3      No results for input(s): FE, TIBC, PSAT, FERR in the last 72 hours.    No results for input(s): PH, PCO2, PO2 in the last 72 hours. No results for input(s): CPK, CKMB in the last 72 hours.     No lab exists for component: TROPONINI  Lab Results   Component Value Date/Time    Glucose (POC) 128 (H) 04/01/2022 01:14 PM       Discharge time spent 35 minutes    Signed:  Felix Ibrahim MD  4/4/2022  12:56 PM

## 2022-04-04 NOTE — PROGRESS NOTES
CM noted discharge order. Patient will discharge home/self today. No CM needs. Discharge plan of care/case management plan validated with provider discharge order. Medicare pt has received, reviewed, and signed 2nd IM letter informing them of their right to appeal the discharge. Signed copied has been placed on pt bedside chart.

## 2022-04-04 NOTE — PROGRESS NOTES
Problem: Aspiration - Risk of  Goal: *Absence of aspiration  Outcome: Progressing Towards Goal     Problem: Patient Education: Go to Patient Education Activity  Goal: Patient/Family Education  Outcome: Progressing Towards Goal     Problem: Falls - Risk of  Goal: *Absence of Falls  Description: Document Terra Cardoso Fall Risk and appropriate interventions in the flowsheet.   Outcome: Progressing Towards Goal  Note: Fall Risk Interventions:            Medication Interventions: Teach patient to arise slowly                   Problem: Patient Education: Go to Patient Education Activity  Goal: Patient/Family Education  Outcome: Progressing Towards Goal

## 2022-04-04 NOTE — PROGRESS NOTES
Hospitalist Progress Note               Daily Progress Note: 4/4/2022      Subjective: The patient is seen for follow up. Lexi Crowe is a 73 y.o. female with a PMH of chronic pain syndrome, 2 back fusions, a mitral valve replacement, hip replacement, COPD, emphysema, HTN, and Hx of 3 strokes (last 16 years ago) presents to the ED with slurred speech, left sided weakness, and inability to write. Patient states that she was at work doing paperwork when she felt left arm weakness and agraphia. She states she could not articulate any words at the time. She states that her stroke 16 years ago presented with similar symptoms. Patient did not have slurred speech or weakness upon examination. She said that she had pneumonia 2 weeks ago and has been on antibiotics and a steroid since. Patient has smoked 1 ppd for 57 years. She denies any alcohol use. 4/1 troponin (165), creatinine (1.68). No previous creatinine available     CTA head showed no large vessel occlusion. Intracranial atherosclerosis without high grade intracranial stenosis.     At the very end of my interview with patient she mentioned as an aside that she has been having some intermittent chest tightness the past several days. She never mentioned this to anybody else because she did not think it meant anything     Reports she was told not to take aspirin after her previous stroke because it was a bleed    Echo showed normal LV function    Carotid Doppler studies are unremarkable    4/3 Cr slightly improved at 1.58    ------------  Patient is scheduled for a nuclear stress test this morning, she was put on NPO post-midnight. Per Dr. Rebecca Romero patient can be discharged from a neurology standpoint.        Problem List:  Problem List as of 4/4/2022 Never Reviewed          Codes Class Noted - Resolved    CVA (cerebrovascular accident) [facility]) ICD-10-CM: I63.9  ICD-9-CM: 434.91  4/1/2022 - Present              Medications reviewed  Current Facility-Administered Medications   Medication Dose Route Frequency    ALPRAZolam (XANAX) tablet 0.25 mg  0.25 mg Oral TID PRN    nicotine (NICODERM CQ) 21 mg/24 hr patch 1 Patch  1 Patch TransDERmal DAILY    albuterol-ipratropium (DUO-NEB) 2.5 MG-0.5 MG/3 ML  3 mL Nebulization Q6H PRN    lithium carbonate tablet 300 mg  300 mg Oral Q12H    metoprolol tartrate (LOPRESSOR) tablet 25 mg  25 mg Oral Q12H    melatonin tablet 5 mg  5 mg Oral QHS PRN    lidocaine 4 % patch 1 Patch  1 Patch TransDERmal Q24H    acetaminophen (TYLENOL) tablet 650 mg  650 mg Oral Q4H PRN    Or    acetaminophen (TYLENOL) solution 650 mg  650 mg Per NG tube Q4H PRN    Or    acetaminophen (TYLENOL) suppository 650 mg  650 mg Rectal Q4H PRN    enoxaparin (LOVENOX) injection 40 mg  40 mg SubCUTAneous Q24H    aspirin chewable tablet 81 mg  81 mg Oral DAILY    atorvastatin (LIPITOR) tablet 40 mg  40 mg Oral QHS       Review of Systems:   A comprehensive review of systems was negative except for that written in the HPI. Objective:   Physical Exam:     Visit Vitals  BP (!) 157/70 (BP 1 Location: Left upper arm, BP Patient Position: At rest;Lying right side)   Pulse 70   Temp 98.3 °F (36.8 °C)   Resp 16   Ht 5' 7\" (1.702 m)   Wt 150 lb (68 kg)   SpO2 95%   Breastfeeding No   BMI 23.49 kg/m²      O2 Device: None (Room air)    Temp (24hrs), Av.1 °F (36.7 °C), Min:97.5 °F (36.4 °C), Max:98.4 °F (36.9 °C)    No intake/output data recorded.  1901 -  0700  In: 684 [P. O.:684]  Out: 901 [Urine:900]    General:   Awake and alert   Lungs:   Clear to auscultation bilaterally. Chest wall:  No tenderness or deformity. Heart:  Regular rate and rhythm, S1, S2 normal, no murmur, click, rub or gallop. Abdomen:   Soft, non-tender. Bowel sounds normal. No masses,  No organomegaly. Extremities: Extremities normal, atraumatic, no cyanosis or edema. Pulses: 2+ and symmetric all extremities.    Skin: Skin color, texture, turgor normal. No rashes or lesions   Neurologic: CNII-XII intact. No gross focal deficits         Data Review:       Recent Days:  Recent Labs     04/01/22  1349   WBC 11.8*   HGB 11.1*   HCT 36.3        Recent Labs     04/03/22  0439 04/02/22  0140 04/01/22  1349    135* 134*   K 4.1 3.8 3.8   * 102 99   CO2 25 28 31   GLU 96 117* 97   BUN 16 20 16   CREA 1.58* 1.82* 1.68*   CA 8.6 8.9 9.0   PHOS 3.6  --   --    ALB 3.2*  --  3.5   TBILI  --   --  0.3   ALT  --   --  25   INR  --   --  1.0     No results for input(s): PH, PCO2, PO2, HCO3, FIO2 in the last 72 hours. 24 Hour Results:  Recent Results (from the past 24 hour(s))   NUCLEAR CARDIAC STRESS TEST    Collection Time: 04/04/22  8:28 AM   Result Value Ref Range    Stress Target  bpm       DUPLEX CAROTID BILATERAL   Final Result      XR CHEST PORT   Final Result   No acute findings. CTA CODE NEURO HEAD AND NECK W CONT   Final Result      1. No large vessel occlusion. Intracranial atherosclerosis without high-grade   intracranial stenosis. 2. No hemodynamically significant stenosis or cervical carotid or vertebral   arteries. All measurements are based on NASCET-like criteria. The results were discussed with/ relayed to Francie Olmos RN Charge at the   completion of the performance of the examination. CT CODE NEURO HEAD WO CONTRAST   Final Result      Chronic findings. I called the report to Dr. Neli Courtney 4/1/2022 1:32 PM.           Assessment:  TIA with transient left hemiplegia and speech difficulty, now resolved     Chest pain with elevated troponin, concerning for acute coronary syndrome.   Troponin now trending down     Hx of Stroke x3- last was 2006     Chronic Pain Syndrome-  Has TENS unit in place     History of mitral Valve Replacement due to Rheumatic Fever 2009     COPD exacerbation     Tobacco abuse     Essential HTN    Acute on likely chronic kidney disease, now improved    Plan:  Continue aspirin and high intensity statin  Nuclear Stress Test to be done this morning.         Rolo Anguiano

## 2022-04-04 NOTE — PROGRESS NOTES
CARDIOLOGY PROGRESS NOTE    Patient Name: Lorene Lewis  : 1955  AGE: 73 y.o. Gender: female    Patient seen and examined in the stress lab prior to nuclear medicine stress test. This is a patient who is followed for chest pain. No active chest pain on exam or during the test. Patient tolerated the procedure well.       Telemetry reviewed, there were no events noted in the past 24 hours. Sinus rhythm     Pertinent review of systems items noted above, all other systems are negative. Current medications reviewed. Physical Examination    Allergies   Allergen Reactions    Penicillins Unknown (comments)     Visit Vitals  BP (!) 171/89   Pulse 70   Temp 98.3 °F (36.8 °C)   Resp 16   Ht 5' 7\" (1.702 m)   Wt 68 kg (150 lb)   SpO2 95%   Breastfeeding No   BMI 23.49 kg/m²         No apparent distress. Heart is regular, rate and rhythm. Normal S1, S2, no murmurs are appreciated. Lungs are clear bilaterally. Abdomen is soft, nontender, normal bowel sounds. Extremities have no edema. Labs reviewed: All lab results for the last 24 hours reviewed. CASE WAS DISCUSSED WITH DR. ORTEZ AND OUR IMPRESSION AND RECOMMENDATIONS ARE AS FOLLOWS:    1. Chest pain with troponin elevation  2. S/p Mitral valve repalcement  3. Prior history of heart failure - Normal LVEF on echocardiogram  4. Hypertension  5. Severe untreated SAMANTHA  6. TIA on presentation    Troponins essentially flat; however, due to elevation and chest pain. NMST showed normal stress test, EF 75%, no ischemia. ue to NEMESIO, which though improving, will order nuclear stress test for am    Patient is clear for discharge from a cardiology standpoint. Please do not hesitate to call if additional questions arise. Celina He NP  2022       Dr. Lela Lincoln Cardiologist    [facility]  [address], [facility]  [phone]

## 2022-10-05 ENCOUNTER — APPOINTMENT (OUTPATIENT)
Dept: GENERAL RADIOLOGY | Age: 67
DRG: 563 | End: 2022-10-05
Attending: STUDENT IN AN ORGANIZED HEALTH CARE EDUCATION/TRAINING PROGRAM
Payer: MEDICARE

## 2022-10-05 ENCOUNTER — HOSPITAL ENCOUNTER (INPATIENT)
Age: 67
LOS: 6 days | Discharge: NURSING, REHAB OR LONG-TERM CARE | DRG: 563 | End: 2022-10-11
Attending: STUDENT IN AN ORGANIZED HEALTH CARE EDUCATION/TRAINING PROGRAM | Admitting: SURGERY
Payer: MEDICARE

## 2022-10-05 ENCOUNTER — APPOINTMENT (OUTPATIENT)
Dept: CT IMAGING | Age: 67
DRG: 563 | End: 2022-10-05
Attending: STUDENT IN AN ORGANIZED HEALTH CARE EDUCATION/TRAINING PROGRAM
Payer: MEDICARE

## 2022-10-05 DIAGNOSIS — S26.91XA CONTUSION OF HEART, INITIAL ENCOUNTER: Primary | ICD-10-CM
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
## 2022-10-05 DIAGNOSIS — S82.024A CLOSED NONDISPLACED LONGITUDINAL FRACTURE OF RIGHT PATELLA, INITIAL ENCOUNTER: ICD-10-CM

## 2022-10-05 PROBLEM — S82.009A PATELLAR FRACTURE: Status: ACTIVE | Noted: 2022-10-05

## 2022-10-05 LAB
ABO + RH BLD: NORMAL
ALBUMIN SERPL-MCNC: 3.6 G/DL (ref 3.5–5)
ALBUMIN/GLOB SERPL: 1 {RATIO} (ref 1.1–2.2)
ALP SERPL-CCNC: 126 U/L (ref 45–117)
ALT SERPL-CCNC: 17 U/L (ref 12–78)
AMPHET UR QL SCN: NEGATIVE
ANION GAP SERPL CALC-SCNC: 4 MMOL/L (ref 5–15)
APPEARANCE UR: CLEAR
AST SERPL W P-5'-P-CCNC: 12 U/L (ref 15–37)
BACTERIA URNS QL MICRO: NEGATIVE /HPF
BARBITURATES UR QL SCN: NEGATIVE
BASE EXCESS BLDV CALC-SCNC: 1.6 MMOL/L (ref 0–3)
BDY SITE: ABNORMAL
BENZODIAZ UR QL: NEGATIVE
BILIRUB SERPL-MCNC: 0.2 MG/DL (ref 0.2–1)
BILIRUB UR QL: NEGATIVE
BLOOD GROUP ANTIBODIES SERPL: NEGATIVE
BODY TEMPERATURE: 98.6
BUN SERPL-MCNC: 18 MG/DL (ref 6–20)
BUN/CREAT SERPL: 13 (ref 12–20)
CA-I BLD-MCNC: 8.9 MG/DL (ref 8.5–10.1)
CANNABINOIDS UR QL SCN: NEGATIVE
CHLORIDE SERPL-SCNC: 104 MMOL/L (ref 97–108)
CO2 SERPL-SCNC: 29 MMOL/L (ref 21–32)
COCAINE UR QL SCN: NEGATIVE
COLOR UR: NORMAL
CREAT SERPL-MCNC: 1.44 MG/DL (ref 0.55–1.02)
DRUG SCRN COMMENT,DRGCM: ABNORMAL
ERYTHROCYTE [DISTWIDTH] IN BLOOD BY AUTOMATED COUNT: 13.8 % (ref 11.5–14.5)
ETHANOL SERPL-MCNC: <10 MG/DL
FIO2 ON VENT: 21 %
GLOBULIN SER CALC-MCNC: 3.5 G/DL (ref 2–4)
GLUCOSE SERPL-MCNC: 103 MG/DL (ref 65–100)
GLUCOSE UR STRIP.AUTO-MCNC: NEGATIVE MG/DL
HCO3 BLDV-SCNC: 27 MMOL/L (ref 24–25)
HCT VFR BLD AUTO: 32.7 % (ref 35–47)
HGB BLD-MCNC: 10.1 G/DL (ref 11.5–16)
HGB UR QL STRIP: NEGATIVE
KETONES UR QL STRIP.AUTO: NEGATIVE MG/DL
LACTATE SERPL-SCNC: 0.5 MMOL/L (ref 0.4–2)
LEUKOCYTE ESTERASE UR QL STRIP.AUTO: NEGATIVE
LIPASE SERPL-CCNC: 140 U/L (ref 73–393)
MCH RBC QN AUTO: 29.7 PG (ref 26–34)
MCHC RBC AUTO-ENTMCNC: 30.9 G/DL (ref 30–36.5)
MCV RBC AUTO: 96.2 FL (ref 80–99)
METHADONE UR QL: NEGATIVE
MUCOUS THREADS URNS QL MICRO: NORMAL /LPF
NITRITE UR QL STRIP.AUTO: NEGATIVE
NRBC # BLD: 0 K/UL (ref 0–0.01)
NRBC BLD-RTO: 0 PER 100 WBC
OPIATES UR QL: POSITIVE
PCO2 BLDV: 47.5 MMHG (ref 41–51)
PCP UR QL: NEGATIVE
PERFORMED BY, TECHID: ABNORMAL
PH BLDV: 7.38 [PH] (ref 7.32–7.42)
PH UR STRIP: 5 [PH] (ref 5–8)
PLATELET # BLD AUTO: 160 K/UL (ref 150–400)
PMV BLD AUTO: 10.7 FL (ref 8.9–12.9)
PO2 BLDV: 67 MMHG (ref 25–40)
POTASSIUM SERPL-SCNC: 4.2 MMOL/L (ref 3.5–5.1)
PROT SERPL-MCNC: 7.1 G/DL (ref 6.4–8.2)
PROT UR STRIP-MCNC: NEGATIVE MG/DL
RBC # BLD AUTO: 3.4 M/UL (ref 3.8–5.2)
RBC #/AREA URNS HPF: NORMAL /HPF (ref 0–5)
SAO2% DEVICE SAO2% SENSOR NAME: ABNORMAL
SODIUM SERPL-SCNC: 137 MMOL/L (ref 136–145)
SP GR UR REFRACTOMETRY: 1.01 (ref 1–1.03)
SPECIMEN EXP DATE BLD: NORMAL
SPECIMEN SITE: ABNORMAL
TROPONIN-HIGH SENSITIVITY: 106 NG/L (ref 0–51)
UROBILINOGEN UR QL STRIP.AUTO: 0.1 EU/DL (ref 0.1–1)
WBC # BLD AUTO: 8.4 K/UL (ref 3.6–11)
WBC URNS QL MICRO: NORMAL /HPF (ref 0–4)

## 2022-10-05 PROCEDURE — 82077 ASSAY SPEC XCP UR&BREATH IA: CPT

## 2022-10-05 PROCEDURE — 74011250637 HC RX REV CODE- 250/637: Performed by: SURGERY

## 2022-10-05 PROCEDURE — 99285 EMERGENCY DEPT VISIT HI MDM: CPT

## 2022-10-05 PROCEDURE — 73562 X-RAY EXAM OF KNEE 3: CPT

## 2022-10-05 PROCEDURE — 90471 IMMUNIZATION ADMIN: CPT

## 2022-10-05 PROCEDURE — 90714 TD VACC NO PRESV 7 YRS+ IM: CPT | Performed by: STUDENT IN AN ORGANIZED HEALTH CARE EDUCATION/TRAINING PROGRAM

## 2022-10-05 PROCEDURE — 96374 THER/PROPH/DIAG INJ IV PUSH: CPT

## 2022-10-05 PROCEDURE — 65620000000 HC RM CCU GENERAL

## 2022-10-05 PROCEDURE — 72170 X-RAY EXAM OF PELVIS: CPT

## 2022-10-05 PROCEDURE — 73140 X-RAY EXAM OF FINGER(S): CPT

## 2022-10-05 PROCEDURE — 72131 CT LUMBAR SPINE W/O DYE: CPT

## 2022-10-05 PROCEDURE — 82803 BLOOD GASES ANY COMBINATION: CPT

## 2022-10-05 PROCEDURE — 96376 TX/PRO/DX INJ SAME DRUG ADON: CPT

## 2022-10-05 PROCEDURE — 80307 DRUG TEST PRSMV CHEM ANLYZR: CPT

## 2022-10-05 PROCEDURE — 87641 MR-STAPH DNA AMP PROBE: CPT

## 2022-10-05 PROCEDURE — 83605 ASSAY OF LACTIC ACID: CPT

## 2022-10-05 PROCEDURE — 72125 CT NECK SPINE W/O DYE: CPT

## 2022-10-05 PROCEDURE — 70450 CT HEAD/BRAIN W/O DYE: CPT

## 2022-10-05 PROCEDURE — 85027 COMPLETE CBC AUTOMATED: CPT

## 2022-10-05 PROCEDURE — 84484 ASSAY OF TROPONIN QUANT: CPT

## 2022-10-05 PROCEDURE — 86900 BLOOD TYPING SEROLOGIC ABO: CPT

## 2022-10-05 PROCEDURE — 74011000636 HC RX REV CODE- 636: Performed by: STUDENT IN AN ORGANIZED HEALTH CARE EDUCATION/TRAINING PROGRAM

## 2022-10-05 PROCEDURE — 93005 ELECTROCARDIOGRAM TRACING: CPT

## 2022-10-05 PROCEDURE — 72128 CT CHEST SPINE W/O DYE: CPT

## 2022-10-05 PROCEDURE — 71045 X-RAY EXAM CHEST 1 VIEW: CPT

## 2022-10-05 PROCEDURE — 73110 X-RAY EXAM OF WRIST: CPT

## 2022-10-05 PROCEDURE — 73700 CT LOWER EXTREMITY W/O DYE: CPT

## 2022-10-05 PROCEDURE — 83690 ASSAY OF LIPASE: CPT

## 2022-10-05 PROCEDURE — 80053 COMPREHEN METABOLIC PANEL: CPT

## 2022-10-05 PROCEDURE — 81001 URINALYSIS AUTO W/SCOPE: CPT

## 2022-10-05 PROCEDURE — 96375 TX/PRO/DX INJ NEW DRUG ADDON: CPT

## 2022-10-05 PROCEDURE — 71260 CT THORAX DX C+: CPT

## 2022-10-05 PROCEDURE — 74011250636 HC RX REV CODE- 250/636: Performed by: STUDENT IN AN ORGANIZED HEALTH CARE EDUCATION/TRAINING PROGRAM

## 2022-10-05 RX ORDER — OXYCODONE AND ACETAMINOPHEN 5; 325 MG/1; MG/1
2 TABLET ORAL
Status: DISCONTINUED | OUTPATIENT
Start: 2022-10-05 | End: 2022-10-06

## 2022-10-05 RX ORDER — MORPHINE SULFATE 4 MG/ML
4 INJECTION INTRAVENOUS ONCE
Status: COMPLETED | OUTPATIENT
Start: 2022-10-05 | End: 2022-10-05

## 2022-10-05 RX ORDER — SODIUM CHLORIDE 9 MG/ML
125 INJECTION, SOLUTION INTRAVENOUS CONTINUOUS
Status: DISCONTINUED | OUTPATIENT
Start: 2022-10-05 | End: 2022-10-06

## 2022-10-05 RX ADMIN — MORPHINE SULFATE 4 MG: 4 INJECTION, SOLUTION INTRAMUSCULAR; INTRAVENOUS at 17:46

## 2022-10-05 RX ADMIN — OXYCODONE AND ACETAMINOPHEN 2 TABLET: 5; 325 TABLET ORAL at 23:11

## 2022-10-05 RX ADMIN — TETANUS AND DIPHTHERIA TOXOIDS ADSORBED 0.5 ML: 2; 2 INJECTION INTRAMUSCULAR at 20:01

## 2022-10-05 RX ADMIN — IOPAMIDOL 100 ML: 755 INJECTION, SOLUTION INTRAVENOUS at 18:56

## 2022-10-05 RX ADMIN — SODIUM CHLORIDE 125 ML/HR: 9 INJECTION, SOLUTION INTRAVENOUS at 20:00

## 2022-10-05 RX ADMIN — MORPHINE SULFATE 4 MG: 4 INJECTION, SOLUTION INTRAMUSCULAR; INTRAVENOUS at 19:48
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
## 2022-10-05 NOTE — Clinical Note
Status[de-identified] INPATIENT [101]   Type of Bed: Intensive Care [6]   Cardiac Monitoring Required?: Yes   Inpatient Hospitalization Certified Necessary for the Following Reasons: 1.  Patient Failed outpatient treatment (further clarification in H&P documentation)   Admitting Diagnosis: Cardiac contusion [719765]   Admitting Diagnosis: Patellar fracture [[id number]]   Admitting Physician: Zaynab Lora [[id number]]   Attending Physician: Zaynab Lora [[id number]]   Estimated Length of Stay: 3-4 Midnights   Discharge Plan[de-identified] Home with Office Follow-up
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
## 2022-10-06 ENCOUNTER — APPOINTMENT (OUTPATIENT)
Dept: NON INVASIVE DIAGNOSTICS | Age: 67
DRG: 563 | End: 2022-10-06
Attending: STUDENT IN AN ORGANIZED HEALTH CARE EDUCATION/TRAINING PROGRAM
Payer: MEDICARE

## 2022-10-06 ENCOUNTER — APPOINTMENT (OUTPATIENT)
Dept: GENERAL RADIOLOGY | Age: 67
DRG: 563 | End: 2022-10-06
Attending: PHYSICIAN ASSISTANT
Payer: MEDICARE
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
## 2022-10-06 LAB
ATRIAL RATE: 86 BPM
CALCULATED P AXIS, ECG09: 84 DEGREES
CALCULATED R AXIS, ECG10: 68 DEGREES
CALCULATED T AXIS, ECG11: 87 DEGREES
DIAGNOSIS, 93000: NORMAL
ECHO AO ASC DIAM: 2.7 CM
ECHO AO ASCENDING AORTA INDEX: 1.42 CM/M2
ECHO AO ROOT DIAM: 2.4 CM
ECHO AO ROOT INDEX: 1.26 CM/M2
ECHO AV MEAN GRADIENT: 11 MMHG
ECHO AV MEAN VELOCITY: 1.5 M/S
ECHO AV PEAK GRADIENT: 22 MMHG
ECHO AV PEAK VELOCITY: 2.3 M/S
ECHO AV VELOCITY RATIO: 0.78
ECHO AV VTI: 49.3 CM
ECHO EST RA PRESSURE: 3 MMHG
ECHO LA AREA 2C: 17.3 CM2
ECHO LA AREA 4C: 17.8 CM2
ECHO LA DIAMETER INDEX: 1.84 CM/M2
ECHO LA DIAMETER: 3.5 CM
ECHO LA MAJOR AXIS: 5 CM
ECHO LA MINOR AXIS: 5.5 CM
ECHO LA TO AORTIC ROOT RATIO: 1.46
ECHO LA VOL 4C: 50 ML (ref 22–52)
ECHO LA VOL BP: 49 ML (ref 22–52)
ECHO LA VOL/BSA BIPLANE: 26 ML/M2 (ref 16–34)
ECHO LA VOLUME INDEX A4C: 26 ML/M2 (ref 16–34)
ECHO LV E' LATERAL VELOCITY: 10 CM/S
ECHO LV E' SEPTAL VELOCITY: 10 CM/S
ECHO LV EDV A2C: 39 ML
ECHO LV EDV NDEX A2C: 21 ML/M2
ECHO LV EJECTION FRACTION A2C: 33 %
ECHO LV ESV A2C: 26 ML
ECHO LV ESV INDEX A2C: 14 ML/M2
ECHO LV FRACTIONAL SHORTENING: 61 % (ref 28–44)
ECHO LV INTERNAL DIMENSION DIASTOLE INDEX: 2.16 CM/M2
ECHO LV INTERNAL DIMENSION DIASTOLIC: 4.1 CM (ref 3.9–5.3)
ECHO LV INTERNAL DIMENSION SYSTOLIC INDEX: 0.84 CM/M2
ECHO LV INTERNAL DIMENSION SYSTOLIC: 1.6 CM
ECHO LV IVSD: 1.1 CM (ref 0.6–0.9)
ECHO LV MASS 2D: 182.5 G (ref 67–162)
ECHO LV MASS INDEX 2D: 96 G/M2 (ref 43–95)
ECHO LV POSTERIOR WALL DIASTOLIC: 1.4 CM (ref 0.6–0.9)
ECHO LV RELATIVE WALL THICKNESS RATIO: 0.68
ECHO LVOT AV VTI INDEX: 0.72
ECHO LVOT MEAN GRADIENT: 6 MMHG
ECHO LVOT PEAK GRADIENT: 13 MMHG
ECHO LVOT PEAK VELOCITY: 1.8 M/S
ECHO LVOT VTI: 35.4 CM
ECHO MV A VELOCITY: 1.07 M/S
ECHO MV E VELOCITY: 2.06 M/S
ECHO MV E/A RATIO: 1.93
ECHO MV E/E' LATERAL: 20.6
ECHO MV E/E' RATIO (AVERAGED): 20.6
ECHO MV E/E' SEPTAL: 20.6
ECHO MV LVOT VTI INDEX: 2.14
ECHO MV MAX VELOCITY: 2.4 M/S
ECHO MV MEAN GRADIENT: 7 MMHG
ECHO MV MEAN VELOCITY: 1.1 M/S
ECHO MV PEAK GRADIENT: 23 MMHG
ECHO MV REGURGITANT PEAK GRADIENT: 16 MMHG
ECHO MV REGURGITANT PEAK VELOCITY: 2 M/S
ECHO MV VTI: 75.6 CM
ECHO PV MAX VELOCITY: 1.2 M/S
ECHO PV PEAK GRADIENT: 6 MMHG
ECHO PVEIN PEAK D VELOCITY: 0.4 M/S
ECHO PVEIN PEAK S VELOCITY: 0.5 M/S
ECHO PVEIN S/D RATIO: 1.3 NO UNITS
ECHO RA AREA 4C: 12.1 CM2
ECHO RA END SYSTOLIC VOLUME APICAL 4 CHAMBER INDEX BSA: 14 ML/M2
ECHO RA VOLUME: 27 ML
ECHO RIGHT VENTRICULAR SYSTOLIC PRESSURE (RVSP): 13 MMHG
ECHO RV FREE WALL PEAK S': 10 CM/S
ECHO TV REGURGITANT MAX VELOCITY: 1.62 M/S
ECHO TV REGURGITANT PEAK GRADIENT: 10 MMHG
MRSA DNA SPEC QL NAA+PROBE: NOT DETECTED
P-R INTERVAL, ECG05: 114 MS
Q-T INTERVAL, ECG07: 398 MS
QRS DURATION, ECG06: 82 MS
QTC CALCULATION (BEZET), ECG08: 510 MS
VENTRICULAR RATE, ECG03: 99 BPM

## 2022-10-06 PROCEDURE — 65610000006 HC RM INTENSIVE CARE

## 2022-10-06 PROCEDURE — 74011250637 HC RX REV CODE- 250/637: Performed by: SURGERY

## 2022-10-06 PROCEDURE — 74011250636 HC RX REV CODE- 250/636: Performed by: STUDENT IN AN ORGANIZED HEALTH CARE EDUCATION/TRAINING PROGRAM

## 2022-10-06 PROCEDURE — 99291 CRITICAL CARE FIRST HOUR: CPT | Performed by: SURGERY

## 2022-10-06 PROCEDURE — 74011000250 HC RX REV CODE- 250: Performed by: SURGERY

## 2022-10-06 PROCEDURE — 93306 TTE W/DOPPLER COMPLETE: CPT

## 2022-10-06 PROCEDURE — 74011250636 HC RX REV CODE- 250/636: Performed by: SURGERY

## 2022-10-06 PROCEDURE — 73552 X-RAY EXAM OF FEMUR 2/>: CPT

## 2022-10-06 RX ORDER — ACETAMINOPHEN 325 MG/1
975 TABLET ORAL EVERY 6 HOURS
Status: DISCONTINUED | OUTPATIENT
Start: 2022-10-06 | End: 2022-10-11 | Stop reason: HOSPADM

## 2022-10-06 RX ORDER — ONDANSETRON 4 MG/1
4 TABLET, ORALLY DISINTEGRATING ORAL
Status: DISCONTINUED | OUTPATIENT
Start: 2022-10-06 | End: 2022-10-11 | Stop reason: HOSPADM

## 2022-10-06 RX ORDER — SODIUM CHLORIDE 9 MG/ML
75 INJECTION, SOLUTION INTRAVENOUS CONTINUOUS
Status: DISCONTINUED | OUTPATIENT
Start: 2022-10-06 | End: 2022-10-11 | Stop reason: HOSPADM

## 2022-10-06 RX ORDER — HYDROMORPHONE HYDROCHLORIDE 1 MG/ML
1 INJECTION, SOLUTION INTRAMUSCULAR; INTRAVENOUS; SUBCUTANEOUS
Status: DISCONTINUED | OUTPATIENT
Start: 2022-10-06 | End: 2022-10-06

## 2022-10-06 RX ORDER — ONDANSETRON 2 MG/ML
4 INJECTION INTRAMUSCULAR; INTRAVENOUS
Status: DISCONTINUED | OUTPATIENT
Start: 2022-10-06 | End: 2022-10-11 | Stop reason: HOSPADM

## 2022-10-06 RX ORDER — OXYCODONE HYDROCHLORIDE 5 MG/1
5 TABLET ORAL
Status: DISCONTINUED | OUTPATIENT
Start: 2022-10-06 | End: 2022-10-11 | Stop reason: HOSPADM

## 2022-10-06 RX ORDER — HYDROMORPHONE HYDROCHLORIDE 1 MG/ML
0.5 INJECTION, SOLUTION INTRAMUSCULAR; INTRAVENOUS; SUBCUTANEOUS
Status: DISCONTINUED | OUTPATIENT
Start: 2022-10-06 | End: 2022-10-11 | Stop reason: HOSPADM

## 2022-10-06 RX ORDER — CETIRIZINE HCL 10 MG
10 TABLET ORAL DAILY
Status: DISCONTINUED | OUTPATIENT
Start: 2022-10-06 | End: 2022-10-11 | Stop reason: HOSPADM

## 2022-10-06 RX ORDER — OXYCODONE HYDROCHLORIDE 5 MG/1
10 TABLET ORAL
Status: DISCONTINUED | OUTPATIENT
Start: 2022-10-06 | End: 2022-10-11 | Stop reason: HOSPADM

## 2022-10-06 RX ORDER — POLYETHYLENE GLYCOL 3350 17 G/17G
17 POWDER, FOR SOLUTION ORAL DAILY PRN
Status: DISCONTINUED | OUTPATIENT
Start: 2022-10-06 | End: 2022-10-11 | Stop reason: HOSPADM

## 2022-10-06 RX ORDER — ACETAMINOPHEN 325 MG/1
650 TABLET ORAL
Status: DISCONTINUED | OUTPATIENT
Start: 2022-10-06 | End: 2022-10-11 | Stop reason: HOSPADM

## 2022-10-06 RX ORDER — ACETAMINOPHEN 650 MG/1
650 SUPPOSITORY RECTAL
Status: DISCONTINUED | OUTPATIENT
Start: 2022-10-06 | End: 2022-10-11 | Stop reason: HOSPADM

## 2022-10-06 RX ORDER — LOSARTAN POTASSIUM 50 MG/1
50 TABLET ORAL DAILY
COMMUNITY
Start: 2022-09-06

## 2022-10-06 RX ORDER — ENOXAPARIN SODIUM 100 MG/ML
40 INJECTION SUBCUTANEOUS DAILY
Status: DISCONTINUED | OUTPATIENT
Start: 2022-10-06 | End: 2022-10-11 | Stop reason: HOSPADM

## 2022-10-06 RX ORDER — SODIUM CHLORIDE 0.9 % (FLUSH) 0.9 %
5-40 SYRINGE (ML) INJECTION EVERY 8 HOURS
Status: DISCONTINUED | OUTPATIENT
Start: 2022-10-06 | End: 2022-10-11 | Stop reason: HOSPADM

## 2022-10-06 RX ORDER — SODIUM CHLORIDE 0.9 % (FLUSH) 0.9 %
5-40 SYRINGE (ML) INJECTION AS NEEDED
Status: DISCONTINUED | OUTPATIENT
Start: 2022-10-06 | End: 2022-10-11 | Stop reason: HOSPADM

## 2022-10-06 RX ADMIN — HYDROMORPHONE HYDROCHLORIDE 0.5 MG: 1 INJECTION, SOLUTION INTRAMUSCULAR; INTRAVENOUS; SUBCUTANEOUS at 12:08

## 2022-10-06 RX ADMIN — ENOXAPARIN SODIUM 40 MG: 100 INJECTION SUBCUTANEOUS at 09:31

## 2022-10-06 RX ADMIN — OXYCODONE HYDROCHLORIDE 10 MG: 10 TABLET ORAL at 15:31

## 2022-10-06 RX ADMIN — SODIUM CHLORIDE 125 ML/HR: 9 INJECTION, SOLUTION INTRAVENOUS at 06:00

## 2022-10-06 RX ADMIN — HYDROMORPHONE HYDROCHLORIDE 0.5 MG: 1 INJECTION, SOLUTION INTRAMUSCULAR; INTRAVENOUS; SUBCUTANEOUS at 21:29

## 2022-10-06 RX ADMIN — SODIUM CHLORIDE, PRESERVATIVE FREE 10 ML: 5 INJECTION INTRAVENOUS at 06:00

## 2022-10-06 RX ADMIN — OXYCODONE HYDROCHLORIDE 10 MG: 10 TABLET ORAL at 19:36

## 2022-10-06 RX ADMIN — OXYCODONE AND ACETAMINOPHEN 2 TABLET: 5; 325 TABLET ORAL at 03:49

## 2022-10-06 RX ADMIN — CETIRIZINE HYDROCHLORIDE 10 MG: 10 TABLET, FILM COATED ORAL at 13:32

## 2022-10-06 RX ADMIN — ONDANSETRON 4 MG: 2 INJECTION INTRAMUSCULAR; INTRAVENOUS at 21:29

## 2022-10-06 RX ADMIN — OXYCODONE AND ACETAMINOPHEN 2 TABLET: 5; 325 TABLET ORAL at 07:47

## 2022-10-06 RX ADMIN — SODIUM CHLORIDE, PRESERVATIVE FREE 10 ML: 5 INJECTION INTRAVENOUS at 21:30

## 2022-10-06 RX ADMIN — SODIUM CHLORIDE, PRESERVATIVE FREE 10 ML: 5 INJECTION INTRAVENOUS at 13:33
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
## 2022-10-07 PROCEDURE — 97530 THERAPEUTIC ACTIVITIES: CPT

## 2022-10-07 PROCEDURE — 74011250636 HC RX REV CODE- 250/636: Performed by: SURGERY

## 2022-10-07 PROCEDURE — 97161 PT EVAL LOW COMPLEX 20 MIN: CPT

## 2022-10-07 PROCEDURE — 65610000006 HC RM INTENSIVE CARE

## 2022-10-07 PROCEDURE — 74011250637 HC RX REV CODE- 250/637: Performed by: SURGERY

## 2022-10-07 PROCEDURE — 99291 CRITICAL CARE FIRST HOUR: CPT | Performed by: SURGERY

## 2022-10-07 PROCEDURE — 97165 OT EVAL LOW COMPLEX 30 MIN: CPT

## 2022-10-07 PROCEDURE — 74011000250 HC RX REV CODE- 250: Performed by: SURGERY

## 2022-10-07 RX ADMIN — ACETAMINOPHEN 975 MG: 325 TABLET, FILM COATED ORAL at 20:24

## 2022-10-07 RX ADMIN — ENOXAPARIN SODIUM 40 MG: 100 INJECTION SUBCUTANEOUS at 09:33

## 2022-10-07 RX ADMIN — SODIUM CHLORIDE, PRESERVATIVE FREE 10 ML: 5 INJECTION INTRAVENOUS at 20:25

## 2022-10-07 RX ADMIN — CETIRIZINE HYDROCHLORIDE 10 MG: 10 TABLET, FILM COATED ORAL at 09:33

## 2022-10-07 RX ADMIN — OXYCODONE HYDROCHLORIDE 10 MG: 10 TABLET ORAL at 09:32

## 2022-10-07 RX ADMIN — OXYCODONE HYDROCHLORIDE 10 MG: 10 TABLET ORAL at 20:28

## 2022-10-07 RX ADMIN — ONDANSETRON 4 MG: 2 INJECTION INTRAMUSCULAR; INTRAVENOUS at 20:28

## 2022-10-07 RX ADMIN — OXYCODONE HYDROCHLORIDE 10 MG: 10 TABLET ORAL at 05:25

## 2022-10-07 RX ADMIN — SODIUM CHLORIDE, PRESERVATIVE FREE 10 ML: 5 INJECTION INTRAVENOUS at 05:30

## 2022-10-07 RX ADMIN — ACETAMINOPHEN 975 MG: 325 TABLET, FILM COATED ORAL at 05:25

## 2022-10-07 RX ADMIN — HYDROMORPHONE HYDROCHLORIDE 0.5 MG: 1 INJECTION, SOLUTION INTRAMUSCULAR; INTRAVENOUS; SUBCUTANEOUS at 12:28

## 2022-10-07 RX ADMIN — ACETAMINOPHEN 975 MG: 325 TABLET, FILM COATED ORAL at 12:28
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
## 2022-10-07 NOTE — PROGRESS NOTES
Bedside and Verbal shift change report given to Bianca Yip (oncoming nurse) by Clement Vail RN (offgoing nurse). Report included the following information SBAR, Kardex, Procedure Summary, Intake/Output, MAR, Recent Results, Med Rec Status, and Cardiac Rhythm NSR .
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
## 2022-10-07 NOTE — PROGRESS NOTES
Patient continues to have pain control issues. Back pain, hip pain, and knee pain continued. VSS.  Progressing  slowly

## 2022-10-08 ENCOUNTER — APPOINTMENT (OUTPATIENT)
Dept: GENERAL RADIOLOGY | Age: 67
DRG: 563 | End: 2022-10-08
Attending: SURGERY
Payer: MEDICARE

## 2022-10-08 LAB
ANION GAP SERPL CALC-SCNC: 3 MMOL/L (ref 5–15)
BUN SERPL-MCNC: 17 MG/DL (ref 6–20)
BUN/CREAT SERPL: 12 (ref 12–20)
CA-I BLD-MCNC: 8.1 MG/DL (ref 8.5–10.1)
CHLORIDE SERPL-SCNC: 104 MMOL/L (ref 97–108)
CO2 SERPL-SCNC: 27 MMOL/L (ref 21–32)
CREAT SERPL-MCNC: 1.4 MG/DL (ref 0.55–1.02)
ERYTHROCYTE [DISTWIDTH] IN BLOOD BY AUTOMATED COUNT: 13.2 % (ref 11.5–14.5)
GLUCOSE SERPL-MCNC: 98 MG/DL (ref 65–100)
HCT VFR BLD AUTO: 24.6 % (ref 35–47)
HCT VFR BLD AUTO: 30.5 % (ref 35–47)
HGB BLD-MCNC: 7.8 G/DL (ref 11.5–16)
HGB BLD-MCNC: 9.6 G/DL (ref 11.5–16)
MCH RBC QN AUTO: 30.5 PG (ref 26–34)
MCHC RBC AUTO-ENTMCNC: 31.7 G/DL (ref 30–36.5)
MCV RBC AUTO: 96.1 FL (ref 80–99)
NRBC # BLD: 0 K/UL (ref 0–0.01)
NRBC BLD-RTO: 0 PER 100 WBC
PLATELET # BLD AUTO: 111 K/UL (ref 150–400)
PMV BLD AUTO: 11.1 FL (ref 8.9–12.9)
POTASSIUM SERPL-SCNC: 4.2 MMOL/L (ref 3.5–5.1)
RBC # BLD AUTO: 2.56 M/UL (ref 3.8–5.2)
SODIUM SERPL-SCNC: 134 MMOL/L (ref 136–145)
WBC # BLD AUTO: 5.1 K/UL (ref 3.6–11)

## 2022-10-08 PROCEDURE — 85027 COMPLETE CBC AUTOMATED: CPT

## 2022-10-08 PROCEDURE — 71045 X-RAY EXAM CHEST 1 VIEW: CPT

## 2022-10-08 PROCEDURE — 80048 BASIC METABOLIC PNL TOTAL CA: CPT

## 2022-10-08 PROCEDURE — 65270000029 HC RM PRIVATE

## 2022-10-08 PROCEDURE — 74011000250 HC RX REV CODE- 250: Performed by: SURGERY

## 2022-10-08 PROCEDURE — 93005 ELECTROCARDIOGRAM TRACING: CPT

## 2022-10-08 PROCEDURE — 77010033678 HC OXYGEN DAILY

## 2022-10-08 PROCEDURE — 74011250636 HC RX REV CODE- 250/636: Performed by: SURGERY

## 2022-10-08 PROCEDURE — 85018 HEMOGLOBIN: CPT

## 2022-10-08 PROCEDURE — 97530 THERAPEUTIC ACTIVITIES: CPT

## 2022-10-08 PROCEDURE — 99232 SBSQ HOSP IP/OBS MODERATE 35: CPT | Performed by: SURGERY

## 2022-10-08 PROCEDURE — 74011250637 HC RX REV CODE- 250/637: Performed by: SURGERY

## 2022-10-08 PROCEDURE — 36415 COLL VENOUS BLD VENIPUNCTURE: CPT

## 2022-10-08 RX ADMIN — ACETAMINOPHEN 975 MG: 325 TABLET, FILM COATED ORAL at 02:48

## 2022-10-08 RX ADMIN — SODIUM CHLORIDE 75 ML/HR: 9 INJECTION, SOLUTION INTRAVENOUS at 16:50

## 2022-10-08 RX ADMIN — OXYCODONE HYDROCHLORIDE 10 MG: 10 TABLET ORAL at 13:55

## 2022-10-08 RX ADMIN — SODIUM CHLORIDE, PRESERVATIVE FREE 10 ML: 5 INJECTION INTRAVENOUS at 14:38

## 2022-10-08 RX ADMIN — CETIRIZINE HYDROCHLORIDE 10 MG: 10 TABLET, FILM COATED ORAL at 08:55

## 2022-10-08 RX ADMIN — SODIUM CHLORIDE, PRESERVATIVE FREE 10 ML: 5 INJECTION INTRAVENOUS at 02:49

## 2022-10-08 RX ADMIN — ACETAMINOPHEN 975 MG: 325 TABLET, FILM COATED ORAL at 09:42

## 2022-10-08 RX ADMIN — ENOXAPARIN SODIUM 40 MG: 100 INJECTION SUBCUTANEOUS at 08:55

## 2022-10-08 RX ADMIN — OXYCODONE HYDROCHLORIDE 10 MG: 10 TABLET ORAL at 08:55

## 2022-10-08 RX ADMIN — ACETAMINOPHEN 975 MG: 325 TABLET, FILM COATED ORAL at 16:38

## 2022-10-08 NOTE — PROGRESS NOTES
1900  Bedside and Verbal shift change report given to Katie (oncoming nurse) by Saima Phipps (offgoing nurse). Report included the following information SBAR, Kardex, Procedure Summary, Intake/Output, MAR, Recent Results, and Cardiac Rhythm NSR .     2028  Patient is complaining of nausea and extreme 10/10 pain to her lower back. PRN Zofran and Roxicodone 10 mg administered. 0715  Bedside and Verbal shift change report given to Herminio (oncoming nurse) by Mirtha Quick (offgoing nurse). Report included the following information SBAR, Kardex, Intake/Output, MAR, Recent Results, and Cardiac Rhythm NSR .
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
## 2022-10-08 NOTE — PROGRESS NOTES
TRANSFER - OUT REPORT:    Verbal report given to Eli Jolley RN(name) on Lissett Tabares  being transferred to (unit) for routine progression of care       Report consisted of patients Situation, Background, Assessment and   Recommendations(SBAR). Information from the following report(s) SBAR, Kardex, Procedure Summary, Recent Results, Med Rec Status, and Cardiac Rhythm SR  was reviewed with the receiving nurse. Lines:   Peripheral IV 10/05/22 Left Antecubital (Active)   Site Assessment Clean, dry, & intact 10/08/22 0700   Phlebitis Assessment 0 10/08/22 0700   Infiltration Assessment 0 10/08/22 0700   Dressing Status Clean, dry, & intact 10/08/22 0700   Dressing Type Transparent 10/08/22 0700   Hub Color/Line Status Pink;Flushed;Patent;Capped 10/08/22 0700   Action Taken Open ports on tubing capped 10/08/22 0700   Alcohol Cap Used Yes 10/08/22 0700       Peripheral IV 10/06/22 Posterior;Right Hand (Active)   Site Assessment Clean, dry, & intact 10/08/22 0700   Phlebitis Assessment 0 10/08/22 0700   Infiltration Assessment 0 10/08/22 0700   Dressing Status Clean, dry, & intact 10/08/22 0700   Dressing Type Transparent 10/08/22 0700   Hub Color/Line Status Pink;Flushed;Patent;Capped 10/08/22 0700   Action Taken Open ports on tubing capped 10/08/22 0700   Alcohol Cap Used Yes 10/08/22 0700        Opportunity for questions and clarification was provided.       Patient transported with:   Registered Nurse  Tech
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
## 2022-10-08 NOTE — PROGRESS NOTES
Patient complained of pain around 2 pm. Gave dose of PRN oxycodone tablet. After approximately 1 hour, she was confused; oriented to self, disoriented to time, situation and place. Put back to bed, raised side rails up, put bed alarms on and initiated frequent rounding. Noted fever of 102.6 at 3:48 PM with heart rate sustaining at 130 bpm. Performed tepid sponge bath and gave due scheduled tylenol. Hooked to 02 at 3 lpm via nasal cannula for O2 saturation of 85%. Latest vital signs at 5:19 PM: Temp 102.5, , RR 16, O2 sat at 96 %, BP at 168/75. Update given to Dr Hyde via phone call. Received orders for 12L EKG, Chest Xray and Urinalysis.

## 2022-10-08 NOTE — PROGRESS NOTES
Problem: Pressure Injury - Risk of  Goal: *Prevention of pressure injury  Description: Document Angel Scale and appropriate interventions in the flowsheet. Outcome: Progressing Towards Goal  Note: Pressure Injury Interventions:  Sensory Interventions: Assess changes in LOC, Assess need for specialty bed, Avoid rigorous massage over bony prominences, Check visual cues for pain, Float heels, Keep linens dry and wrinkle-free, Maintain/enhance activity level, Minimize linen layers, Monitor skin under medical devices, Pad between skin to skin, Pressure redistribution bed/mattress (bed type), Turn and reposition approx. every two hours (pillows and wedges if needed)         Activity Interventions: Increase time out of bed, Pressure redistribution bed/mattress(bed type)    Mobility Interventions: Assess need for specialty bed, Float heels, HOB 30 degrees or less, Pressure redistribution bed/mattress (bed type), Turn and reposition approx. every two hours(pillow and wedges)    Nutrition Interventions: Document food/fluid/supplement intake, Discuss nutritional consult with provider, Offer support with meals,snacks and hydration    Friction and Shear Interventions: Apply protective barrier, creams and emollients, Foam dressings/transparent film/skin sealants, HOB 30 degrees or less, Lift sheet, Lift team/patient mobility team, Minimize layers, Transferring/repositioning devices                Problem: Patient Education: Go to Patient Education Activity  Goal: Patient/Family Education  Outcome: Progressing Towards Goal     Problem: Falls - Risk of  Goal: *Absence of Falls  Description: Document Tito Fall Risk and appropriate interventions in the flowsheet.   Outcome: Progressing Towards Goal  Note: Fall Risk Interventions:  Mobility Interventions: Bed/chair exit alarm, Communicate number of staff needed for ambulation/transfer, Patient to call before getting OOB, Assess mobility with egress test, Utilize gait belt for transfers/ambulation, Utilize walker, cane, or other assistive device, Strengthening exercises (ROM-active/passive)    Mentation Interventions: Adequate sleep, hydration, pain control, Bed/chair exit alarm, Evaluate medications/consider consulting pharmacy, Door open when patient unattended, Update white board, Toileting rounds, Room close to nurse's station, Increase mobility, More frequent rounding, Reorient patient    Medication Interventions: Bed/chair exit alarm, Evaluate medications/consider consulting pharmacy, Patient to call before getting OOB, Teach patient to arise slowly, Utilize gait belt for transfers/ambulation    Elimination Interventions: Bed/chair exit alarm, Call light in reach, Patient to call for help with toileting needs, Toileting schedule/hourly rounds, Toilet paper/wipes in reach    History of Falls Interventions: Bed/chair exit alarm, Room close to nurse's station         Problem: Patient Education: Go to Patient Education Activity  Goal: Patient/Family Education  Outcome: Progressing Towards Goal

## 2022-10-09 LAB
ALBUMIN SERPL-MCNC: 2.7 G/DL (ref 3.5–5)
ALBUMIN/GLOB SERPL: 1 {RATIO} (ref 1.1–2.2)
ALP SERPL-CCNC: 118 U/L (ref 45–117)
ALT SERPL-CCNC: 38 U/L (ref 12–78)
ANION GAP SERPL CALC-SCNC: 5 MMOL/L (ref 5–15)
APPEARANCE UR: CLEAR
AST SERPL W P-5'-P-CCNC: 35 U/L (ref 15–37)
ATRIAL RATE: 117 BPM
BACTERIA URNS QL MICRO: NEGATIVE /HPF
BILIRUB SERPL-MCNC: 0.4 MG/DL (ref 0.2–1)
BILIRUB UR QL: NEGATIVE
BUN SERPL-MCNC: 17 MG/DL (ref 6–20)
BUN/CREAT SERPL: 13 (ref 12–20)
CA-I BLD-MCNC: 8.5 MG/DL (ref 8.5–10.1)
CALCULATED P AXIS, ECG09: 78 DEGREES
CALCULATED R AXIS, ECG10: 70 DEGREES
CALCULATED T AXIS, ECG11: 58 DEGREES
CHLORIDE SERPL-SCNC: 107 MMOL/L (ref 97–108)
CO2 SERPL-SCNC: 27 MMOL/L (ref 21–32)
COLOR UR: ABNORMAL
CREAT SERPL-MCNC: 1.32 MG/DL (ref 0.55–1.02)
DIAGNOSIS, 93000: NORMAL
ERYTHROCYTE [DISTWIDTH] IN BLOOD BY AUTOMATED COUNT: 13.1 % (ref 11.5–14.5)
GLOBULIN SER CALC-MCNC: 2.8 G/DL (ref 2–4)
GLUCOSE SERPL-MCNC: 92 MG/DL (ref 65–100)
GLUCOSE UR STRIP.AUTO-MCNC: NEGATIVE MG/DL
HCT VFR BLD AUTO: 26.7 % (ref 35–47)
HGB BLD-MCNC: 8.4 G/DL (ref 11.5–16)
HGB UR QL STRIP: NEGATIVE
KETONES UR QL STRIP.AUTO: NEGATIVE MG/DL
LEUKOCYTE ESTERASE UR QL STRIP.AUTO: ABNORMAL
MCH RBC QN AUTO: 30.2 PG (ref 26–34)
MCHC RBC AUTO-ENTMCNC: 31.5 G/DL (ref 30–36.5)
MCV RBC AUTO: 96 FL (ref 80–99)
MUCOUS THREADS URNS QL MICRO: ABNORMAL /LPF
NITRITE UR QL STRIP.AUTO: NEGATIVE
NRBC # BLD: 0 K/UL (ref 0–0.01)
NRBC BLD-RTO: 0 PER 100 WBC
P-R INTERVAL, ECG05: 162 MS
PH UR STRIP: 5 [PH] (ref 5–8)
PLATELET # BLD AUTO: 130 K/UL (ref 150–400)
PMV BLD AUTO: 9.8 FL (ref 8.9–12.9)
POTASSIUM SERPL-SCNC: 4 MMOL/L (ref 3.5–5.1)
PROT SERPL-MCNC: 5.5 G/DL (ref 6.4–8.2)
PROT UR STRIP-MCNC: NEGATIVE MG/DL
Q-T INTERVAL, ECG07: 314 MS
QRS DURATION, ECG06: 86 MS
QTC CALCULATION (BEZET), ECG08: 438 MS
RBC # BLD AUTO: 2.78 M/UL (ref 3.8–5.2)
RBC #/AREA URNS HPF: ABNORMAL /HPF (ref 0–5)
SODIUM SERPL-SCNC: 139 MMOL/L (ref 136–145)
SP GR UR REFRACTOMETRY: 1.01 (ref 1–1.03)
UA: UC IF INDICATED,UAUC: ABNORMAL
UROBILINOGEN UR QL STRIP.AUTO: 0.1 EU/DL (ref 0.1–1)
VENTRICULAR RATE, ECG03: 117 BPM
WBC # BLD AUTO: 4.3 K/UL (ref 3.6–11)
WBC URNS QL MICRO: ABNORMAL /HPF (ref 0–4)

## 2022-10-09 PROCEDURE — 74011250636 HC RX REV CODE- 250/636: Performed by: SURGERY

## 2022-10-09 PROCEDURE — 65270000029 HC RM PRIVATE

## 2022-10-09 PROCEDURE — 74011250637 HC RX REV CODE- 250/637: Performed by: SURGERY

## 2022-10-09 PROCEDURE — 97110 THERAPEUTIC EXERCISES: CPT

## 2022-10-09 PROCEDURE — 97530 THERAPEUTIC ACTIVITIES: CPT

## 2022-10-09 PROCEDURE — 80053 COMPREHEN METABOLIC PANEL: CPT

## 2022-10-09 PROCEDURE — 99232 SBSQ HOSP IP/OBS MODERATE 35: CPT | Performed by: SURGERY

## 2022-10-09 PROCEDURE — 81001 URINALYSIS AUTO W/SCOPE: CPT

## 2022-10-09 PROCEDURE — 74011000250 HC RX REV CODE- 250: Performed by: SURGERY

## 2022-10-09 PROCEDURE — 85027 COMPLETE CBC AUTOMATED: CPT

## 2022-10-09 PROCEDURE — 93005 ELECTROCARDIOGRAM TRACING: CPT

## 2022-10-09 RX ADMIN — SODIUM CHLORIDE, PRESERVATIVE FREE 10 ML: 5 INJECTION INTRAVENOUS at 17:24

## 2022-10-09 RX ADMIN — OXYCODONE HYDROCHLORIDE 10 MG: 10 TABLET ORAL at 08:29

## 2022-10-09 RX ADMIN — OXYCODONE HYDROCHLORIDE 10 MG: 10 TABLET ORAL at 17:14

## 2022-10-09 RX ADMIN — SODIUM CHLORIDE 1000 ML: 9 INJECTION, SOLUTION INTRAVENOUS at 12:24

## 2022-10-09 RX ADMIN — ACETAMINOPHEN 975 MG: 325 TABLET, FILM COATED ORAL at 00:53

## 2022-10-09 RX ADMIN — ACETAMINOPHEN 975 MG: 325 TABLET, FILM COATED ORAL at 07:36

## 2022-10-09 RX ADMIN — CETIRIZINE HYDROCHLORIDE 10 MG: 10 TABLET, FILM COATED ORAL at 08:30

## 2022-10-09 RX ADMIN — OXYCODONE HYDROCHLORIDE 10 MG: 10 TABLET ORAL at 21:10

## 2022-10-09 RX ADMIN — OXYCODONE 5 MG: 5 TABLET ORAL at 12:15

## 2022-10-09 RX ADMIN — ACETAMINOPHEN 975 MG: 325 TABLET, FILM COATED ORAL at 17:14

## 2022-10-09 RX ADMIN — ACETAMINOPHEN 975 MG: 325 TABLET, FILM COATED ORAL at 12:15

## 2022-10-09 RX ADMIN — SODIUM CHLORIDE, PRESERVATIVE FREE 10 ML: 5 INJECTION INTRAVENOUS at 21:13

## 2022-10-09 RX ADMIN — SODIUM CHLORIDE 75 ML/HR: 9 INJECTION, SOLUTION INTRAVENOUS at 21:02

## 2022-10-09 RX ADMIN — ENOXAPARIN SODIUM 40 MG: 100 INJECTION SUBCUTANEOUS at 08:29

## 2022-10-09 NOTE — PROGRESS NOTES
Problem: Pressure Injury - Risk of  Goal: *Prevention of pressure injury  Description: Document Angel Scale and appropriate interventions in the flowsheet.   Outcome: Progressing Towards Goal  Note: Pressure Injury Interventions:  Sensory Interventions: Discuss PT/OT consult with provider, Float heels, Keep linens dry and wrinkle-free, Minimize linen layers         Activity Interventions: PT/OT evaluation, Pressure redistribution bed/mattress(bed type), Increase time out of bed    Mobility Interventions: Float heels, HOB 30 degrees or less    Nutrition Interventions: Document food/fluid/supplement intake, Offer support with meals,snacks and hydration    Friction and Shear Interventions: HOB 30 degrees or less, Minimize layers, Foam dressings/transparent film/skin sealants                Problem: Patient Education: Go to Patient Education Activity  Goal: Patient/Family Education  Outcome: Progressing Towards Goal     Problem: Patient Education: Go to Patient Education Activity  Goal: Patient/Family Education  Outcome: Progressing Towards Goal
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
## 2022-10-10 LAB
ATRIAL RATE: 77 BPM
CALCULATED P AXIS, ECG09: 85 DEGREES
CALCULATED R AXIS, ECG10: 59 DEGREES
CALCULATED T AXIS, ECG11: 79 DEGREES
DIAGNOSIS, 93000: NORMAL
P-R INTERVAL, ECG05: 162 MS
Q-T INTERVAL, ECG07: 382 MS
QRS DURATION, ECG06: 84 MS
QTC CALCULATION (BEZET), ECG08: 432 MS
VENTRICULAR RATE, ECG03: 77 BPM

## 2022-10-10 PROCEDURE — 74011250636 HC RX REV CODE- 250/636: Performed by: SURGERY

## 2022-10-10 PROCEDURE — 74011000250 HC RX REV CODE- 250: Performed by: SURGERY

## 2022-10-10 PROCEDURE — 97530 THERAPEUTIC ACTIVITIES: CPT

## 2022-10-10 PROCEDURE — 99232 SBSQ HOSP IP/OBS MODERATE 35: CPT | Performed by: SURGERY

## 2022-10-10 PROCEDURE — 74011250637 HC RX REV CODE- 250/637: Performed by: SURGERY

## 2022-10-10 PROCEDURE — 65270000029 HC RM PRIVATE

## 2022-10-10 PROCEDURE — 94761 N-INVAS EAR/PLS OXIMETRY MLT: CPT

## 2022-10-10 PROCEDURE — 97116 GAIT TRAINING THERAPY: CPT

## 2022-10-10 PROCEDURE — 97110 THERAPEUTIC EXERCISES: CPT

## 2022-10-10 RX ADMIN — SODIUM CHLORIDE, PRESERVATIVE FREE 10 ML: 5 INJECTION INTRAVENOUS at 15:20

## 2022-10-10 RX ADMIN — ACETAMINOPHEN 975 MG: 325 TABLET, FILM COATED ORAL at 05:33

## 2022-10-10 RX ADMIN — ACETAMINOPHEN 975 MG: 325 TABLET, FILM COATED ORAL at 18:38

## 2022-10-10 RX ADMIN — SODIUM CHLORIDE, PRESERVATIVE FREE 10 ML: 5 INJECTION INTRAVENOUS at 05:34

## 2022-10-10 RX ADMIN — SODIUM CHLORIDE, PRESERVATIVE FREE 10 ML: 5 INJECTION INTRAVENOUS at 21:43

## 2022-10-10 RX ADMIN — OXYCODONE HYDROCHLORIDE 10 MG: 10 TABLET ORAL at 21:41

## 2022-10-10 RX ADMIN — OXYCODONE HYDROCHLORIDE 10 MG: 10 TABLET ORAL at 09:24

## 2022-10-10 RX ADMIN — ENOXAPARIN SODIUM 40 MG: 100 INJECTION SUBCUTANEOUS at 09:24

## 2022-10-10 RX ADMIN — ACETAMINOPHEN 975 MG: 325 TABLET, FILM COATED ORAL at 12:25

## 2022-10-10 RX ADMIN — OXYCODONE HYDROCHLORIDE 10 MG: 10 TABLET ORAL at 15:16

## 2022-10-10 RX ADMIN — CETIRIZINE HYDROCHLORIDE 10 MG: 10 TABLET, FILM COATED ORAL at 09:25
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
## 2022-10-10 NOTE — PROGRESS NOTES
Problem: Pressure Injury - Risk of  Goal: *Prevention of pressure injury  Description: Document Angel Scale and appropriate interventions in the flowsheet. Outcome: Progressing Towards Goal  Note: Pressure Injury Interventions:  Sensory Interventions: Assess changes in LOC, Minimize linen layers, Monitor skin under medical devices, Pressure redistribution bed/mattress (bed type)         Activity Interventions: PT/OT evaluation    Mobility Interventions: PT/OT evaluation, HOB 30 degrees or less    Nutrition Interventions: Document food/fluid/supplement intake    Friction and Shear Interventions: Apply protective barrier, creams and emollients, HOB 30 degrees or less, Minimize layers                Problem: Patient Education: Go to Patient Education Activity  Goal: Patient/Family Education  Outcome: Progressing Towards Goal     Problem: Falls - Risk of  Goal: *Absence of Falls  Description: Document Tito Fall Risk and appropriate interventions in the flowsheet.   Outcome: Progressing Towards Goal  Note: Fall Risk Interventions:  Mobility Interventions: Bed/chair exit alarm    Mentation Interventions: Bed/chair exit alarm    Medication Interventions: Patient to call before getting OOB    Elimination Interventions: Bed/chair exit alarm, Call light in reach    History of Falls Interventions: Bed/chair exit alarm         Problem: Patient Education: Go to Patient Education Activity  Goal: Patient/Family Education  Outcome: Progressing Towards Goal     Problem: Discharge Planning  Goal: *Discharge to safe environment  Outcome: Progressing Towards Goal  Goal: *Knowledge of medication management  Outcome: Progressing Towards Goal  Goal: *Knowledge of discharge instructions  Outcome: Progressing Towards Goal     Problem: Patient Education: Go to Patient Education Activity  Goal: Patient/Family Education  Outcome: Progressing Towards Goal     Problem: Patient Education: Go to Patient Education Activity  Goal: Patient/Family Education  Outcome: Progressing Towards Goal     Problem: Patient Education: Go to Patient Education Activity  Goal: Patient/Family Education  Outcome: Progressing Towards Goal
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
## 2022-10-10 NOTE — PROGRESS NOTES
CM discussed discharge planning with patient and spouse at bedside. They request patient go to IRF.  states he is unable to care for patient at home with [facility] due he was also in [address]. 1pm- [facility] Liaison here to assess patient.
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
## 2022-10-11 VITALS
BODY MASS INDEX: 33.17 KG/M2 | DIASTOLIC BLOOD PRESSURE: 82 MMHG | HEART RATE: 83 BPM | RESPIRATION RATE: 18 BRPM | WEIGHT: 199.08 LBS | SYSTOLIC BLOOD PRESSURE: 172 MMHG | TEMPERATURE: 98.2 F | OXYGEN SATURATION: 96 % | HEIGHT: 65 IN

## 2022-10-11 PROCEDURE — 97110 THERAPEUTIC EXERCISES: CPT

## 2022-10-11 PROCEDURE — 94761 N-INVAS EAR/PLS OXIMETRY MLT: CPT

## 2022-10-11 PROCEDURE — 97116 GAIT TRAINING THERAPY: CPT

## 2022-10-11 PROCEDURE — 99238 HOSP IP/OBS DSCHRG MGMT 30/<: CPT | Performed by: SURGERY

## 2022-10-11 PROCEDURE — 74011250636 HC RX REV CODE- 250/636: Performed by: SURGERY

## 2022-10-11 PROCEDURE — 94762 N-INVAS EAR/PLS OXIMTRY CONT: CPT

## 2022-10-11 PROCEDURE — 74011250637 HC RX REV CODE- 250/637: Performed by: SURGERY

## 2022-10-11 PROCEDURE — 74011000250 HC RX REV CODE- 250: Performed by: SURGERY

## 2022-10-11 PROCEDURE — 97530 THERAPEUTIC ACTIVITIES: CPT

## 2022-10-11 RX ORDER — OXYCODONE HYDROCHLORIDE 10 MG/1
10 TABLET ORAL
Qty: 30 TABLET | Refills: 0 | Status: SHIPPED | OUTPATIENT
Start: 2022-10-11 | End: 2022-10-25

## 2022-10-11 RX ADMIN — HYDROMORPHONE HYDROCHLORIDE 0.5 MG: 1 INJECTION, SOLUTION INTRAMUSCULAR; INTRAVENOUS; SUBCUTANEOUS at 12:48

## 2022-10-11 RX ADMIN — ACETAMINOPHEN 975 MG: 325 TABLET, FILM COATED ORAL at 06:38

## 2022-10-11 RX ADMIN — SODIUM CHLORIDE, PRESERVATIVE FREE 10 ML: 5 INJECTION INTRAVENOUS at 06:40

## 2022-10-11 RX ADMIN — ACETAMINOPHEN 975 MG: 325 TABLET, FILM COATED ORAL at 12:48

## 2022-10-11 RX ADMIN — ENOXAPARIN SODIUM 40 MG: 100 INJECTION SUBCUTANEOUS at 10:00

## 2022-10-11 RX ADMIN — ACETAMINOPHEN 975 MG: 325 TABLET, FILM COATED ORAL at 01:14

## 2022-10-11 NOTE — PROGRESS NOTES
Spiritual Care Assessment/Progress Note  [facility]      NAME: Dottie Grady      MRN: [id number]  AGE: 78 y.o.  SEX: female  Church Affiliation: [employer]   Language: English     10/11/2022     Total Time (in minutes): 14     Spiritual Assessment begun in [facility] through conversation with:         [x]Patient        [] Family    [] Friend(s)        Reason for Consult: Initial/Spiritual assessment, patient floor     Spiritual beliefs: (Please include comment if needed)     [] Identifies with a cole tradition:         [] Supported by a cole community:            [] Claims no spiritual orientation:           [] Seeking spiritual identity:                [] Adheres to an individual form of spirituality:           [x] Not able to assess:                           Identified resources for coping:      [] Prayer                               [] Music                  [] Guided Imagery     [] Family/friends                 [] Pet visits     [] Devotional reading                         [x] Unknown     [] Other:                                             Interventions offered during this visit: (See comments for more details)    Patient Interventions: Affirmation of emotions/emotional suffering, Coping skills reviewed/reinforced, Initial/Spiritual assessment, patient floor, Normalization of emotional/spiritual concerns, Catharsis/review of pertinent events in supportive environment           Plan of Care:     [] Support spiritual and/or cultural needs    [] Support AMD and/or advance care planning process      [] Support grieving process   [] Coordinate Rites and/or Rituals    [] Coordination with community clergy   [] No spiritual needs identified at this time   [] Detailed Plan of Care below (See Comments)  [] Make referral to Music Therapy  [] Make referral to Pet Therapy     [] Make referral to Addiction services  [] Make referral to [facility]  [] Make referral to Spiritual Care Partner  [] No future visits requested        [x] Contact Spiritual Care for further referrals     Comments:  Visited patient in [address] [facility] for initial assessment. Patient was alone during the visit. In response to inquiry to be visited, patient stated this was not a good time because she is not having a good day and seemed to process her emotions verbally. Provided supportive presence while exploring her needs and resources. Normalized her emotions as well as affirmed her decision and well being. Advised of  availability. Contact chaplains for further referrals. Chaplain Megha Corcoran M.Div.    can be reached by calling the  at [facility]  [phone]
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
## 2022-10-11 NOTE — PROGRESS NOTES
Patient accepted to [facility] for inpatient rehab. CM informed patient and MD. Patient will discharge to [facility]. She is to arrive at 7:30pm. Nurse to call report to [phone].  will have family member transport her.     3:20pm- Patient son can transport her to [facility] at 4pm. ROGELIO spoke with Dany Marrero at [facility] who agreed with 4pm arrival time. Primary nurse aware.

## 2022-10-11 NOTE — PROGRESS NOTES
Problem: Pressure Injury - Risk of  Goal: *Prevention of pressure injury  Description: Document Angel Scale and appropriate interventions in the flowsheet. Outcome: Progressing Towards Goal  Note: Pressure Injury Interventions:  Sensory Interventions: Assess need for specialty bed         Activity Interventions: Increase time out of bed, PT/OT evaluation    Mobility Interventions: Float heels, HOB 30 degrees or less, Pressure redistribution bed/mattress (bed type), PT/OT evaluation    Nutrition Interventions: Document food/fluid/supplement intake    Friction and Shear Interventions: Apply protective barrier, creams and emollients, Lift sheet, Minimize layers, Sit at 90-degree angle                Problem: Patient Education: Go to Patient Education Activity  Goal: Patient/Family Education  Outcome: Progressing Towards Goal     Problem: Falls - Risk of  Goal: *Absence of Falls  Description: Document Lettie Duverney Fall Risk and appropriate interventions in the flowsheet.   Outcome: Progressing Towards Goal  Note: Fall Risk Interventions:  Mobility Interventions: Bed/chair exit alarm, OT consult for ADLs, Patient to call before getting OOB, PT Consult for mobility concerns, PT Consult for assist device competence, Utilize walker, cane, or other assistive device    Mentation Interventions: Bed/chair exit alarm, More frequent rounding, Reorient patient, Room close to nurse's station    Medication Interventions: Bed/chair exit alarm, Patient to call before getting OOB, Teach patient to arise slowly    Elimination Interventions: Bed/chair exit alarm, Call light in reach, Patient to call for help with toileting needs    History of Falls Interventions: Bed/chair exit alarm, Door open when patient unattended         Problem: Patient Education: Go to Patient Education Activity  Goal: Patient/Family Education  Outcome: Progressing Towards Goal     Problem: Discharge Planning  Goal: *Discharge to safe environment  Outcome: Progressing Towards Goal  Goal: *Knowledge of medication management  Outcome: Progressing Towards Goal  Goal: *Knowledge of discharge instructions  Outcome: Progressing Towards Goal     Problem: Patient Education: Go to Patient Education Activity  Goal: Patient/Family Education  Outcome: Progressing Towards Goal     Problem: Patient Education: Go to Patient Education Activity  Goal: Patient/Family Education  Outcome: Progressing Towards Goal     Problem: Pain  Goal: *Control of Pain  Outcome: Progressing Towards Goal  Goal: *PALLIATIVE CARE:  Alleviation of Pain  Outcome: Progressing Towards Goal     Problem: Patient Education: Go to Patient Education Activity  Goal: Patient/Family Education  Outcome: Progressing Towards Goal

## 2022-10-27 ENCOUNTER — TRANSCRIBE ORDER (OUTPATIENT)
Dept: SCHEDULING | Age: 67
End: 2022-10-27

## 2022-10-27 DIAGNOSIS — N94.9 ADNEXAL CYST: ICD-10-CM

## 2022-10-27 DIAGNOSIS — N83.201 CYST OF OVARY, RIGHT: Primary | ICD-10-CM

## 2023-04-22 DIAGNOSIS — N94.9 ADNEXAL CYST: ICD-10-CM

## 2023-04-22 DIAGNOSIS — N83.201 CYST OF OVARY, RIGHT: Primary | ICD-10-CM

## 2023-05-25 ENCOUNTER — HOSPITAL ENCOUNTER (EMERGENCY)
Facility: HOSPITAL | Age: 68
Discharge: HOME | End: 2023-05-25
Attending: EMERGENCY MEDICINE
Payer: MEDICARE

## 2023-05-25 VITALS
TEMPERATURE: 98.3 F | RESPIRATION RATE: 18 BRPM | SYSTOLIC BLOOD PRESSURE: 157 MMHG | DIASTOLIC BLOOD PRESSURE: 70 MMHG | BODY MASS INDEX: 29.28 KG/M2 | HEART RATE: 73 BPM | WEIGHT: 171.5 LBS | HEIGHT: 64 IN | OXYGEN SATURATION: 100 %

## 2023-05-25 DIAGNOSIS — M79.89 LEG SWELLING: Primary | ICD-10-CM

## 2023-05-25 PROCEDURE — 99284 EMERGENCY DEPT VISIT MOD MDM: CPT

## 2023-05-25 PROCEDURE — 6370000000 HC RX 637 (ALT 250 FOR IP): Performed by: EMERGENCY MEDICINE

## 2023-05-25 PROCEDURE — 96372 THER/PROPH/DIAG INJ SC/IM: CPT

## 2023-05-25 PROCEDURE — 6360000002 HC RX W HCPCS: Performed by: EMERGENCY MEDICINE

## 2023-05-25 RX ORDER — ACETAMINOPHEN 500 MG
1000 TABLET ORAL
Status: COMPLETED | OUTPATIENT
Start: 2023-05-25 | End: 2023-05-25

## 2023-05-25 RX ORDER — ENOXAPARIN SODIUM 100 MG/ML
1 INJECTION SUBCUTANEOUS ONCE
Status: COMPLETED | OUTPATIENT
Start: 2023-05-25 | End: 2023-05-25

## 2023-05-25 RX ADMIN — ACETAMINOPHEN 1000 MG: 500 TABLET ORAL at 21:24

## 2023-05-25 RX ADMIN — ENOXAPARIN SODIUM 80 MG: 80 INJECTION SUBCUTANEOUS at 22:38

## 2023-05-25 ASSESSMENT — LIFESTYLE VARIABLES
HOW MANY STANDARD DRINKS CONTAINING ALCOHOL DO YOU HAVE ON A TYPICAL DAY: PATIENT DOES NOT DRINK
HOW OFTEN DO YOU HAVE A DRINK CONTAINING ALCOHOL: NEVER

## 2023-05-25 ASSESSMENT — PAIN SCALES - GENERAL
PAINLEVEL_OUTOF10: 10
PAINLEVEL_OUTOF10: 10

## 2023-05-25 ASSESSMENT — PAIN DESCRIPTION - ORIENTATION: ORIENTATION: RIGHT

## 2023-05-25 ASSESSMENT — PAIN DESCRIPTION - LOCATION: LOCATION: KNEE

## 2023-05-25 ASSESSMENT — PAIN - FUNCTIONAL ASSESSMENT: PAIN_FUNCTIONAL_ASSESSMENT: 0-10

## 2023-05-25 NOTE — ED PROVIDER NOTES
[facility] EMERGENCY DEPT  EMERGENCY DEPARTMENT HISTORY AND PHYSICAL EXAM      Date: 5/25/2023  Patient Name: Malathi Melton  MRN: [id number]  [place]: 1955  Date of evaluation: 5/25/2023  Provider: Sowmya Rubalcava MD   Note Started: 7:28 PM EDT 5/25/23    HISTORY OF PRESENT ILLNESS     Chief Complaint   Patient presents with    Leg Swelling       History Provided By: Patient    HPI: Malathi Melton is a 78 y.o. female planing of right lower extremity swelling and pain in the right calf. She is concerned she may have a blood clot. Is been that way for the past 3 days. She denies any numbness or ting or loss of sensation does that she has some increased edema in the leg without any other exacerbating or relieving factors she has not treated with anything is felt to improve. She denies any fever, chills, nausea, vomiting, chest pain, shortness of breath, rash, diarrhea, headache, night sweats, illness. PAST MEDICAL HISTORY   Past Medical History:  Past Medical History:   Diagnosis Date    Bipolar 1 disorder ([address]     Chronic obstructive pulmonary disease (HCC)     Chronic pain     TENS unit in back    CVA (cerebral vascular accident) ([facility] [address]     x 3    HTN (hypertension)        Past Surgical History:  Past Surgical History:   Procedure Laterality Date    MITRAL VALVE REPLACEMENT  2009    bioprosthetic    TOTAL HIP ARTHROPLASTY         Family History:  Family History   Problem Relation Age of Onset    Heart Disease Mother     Lung Disease Father        Social History:  Social History     Tobacco Use    Smoking status: Former     Packs/day: 2.00     Types: Cigarettes    Smokeless tobacco: Never   Substance Use Topics    Alcohol use: Never    Drug use: Never       Allergies:   Allergies   Allergen Reactions    Penicillins Anaphylaxis     Other reaction(s): Unknown (comments)    Sulfa Antibiotics      Other reaction(s): Sneezing       PCP: Sunny Villatoro MD    Current Meds:   No current facility-administered

## 2023-05-25 NOTE — ED TRIAGE NOTES
Patient presents to ED c/o right leg swelling. Patient went to urgent care and stated that she needs to go to ED r/o blood clot.

## 2023-05-26 ENCOUNTER — HOSPITAL ENCOUNTER (OUTPATIENT)
Facility: HOSPITAL | Age: 68
End: 2023-05-26
Payer: MEDICARE

## 2023-05-26 DIAGNOSIS — R60.9 EDEMA, UNSPECIFIED TYPE: ICD-10-CM

## 2023-05-26 DIAGNOSIS — R60.9 SWELLING: ICD-10-CM

## 2023-05-26 PROCEDURE — 93971 EXTREMITY STUDY: CPT

## 2023-08-20 ENCOUNTER — APPOINTMENT (OUTPATIENT)
Facility: HOSPITAL | Age: 68
End: 2023-08-20
Payer: MEDICARE

## 2023-08-20 ENCOUNTER — HOSPITAL ENCOUNTER (EMERGENCY)
Facility: HOSPITAL | Age: 68
Discharge: HOME | End: 2023-08-20
Attending: EMERGENCY MEDICINE
Payer: MEDICARE

## 2023-08-20 VITALS
TEMPERATURE: 98.8 F | HEIGHT: 64 IN | HEART RATE: 89 BPM | WEIGHT: 174 LBS | BODY MASS INDEX: 29.71 KG/M2 | OXYGEN SATURATION: 98 % | RESPIRATION RATE: 21 BRPM | SYSTOLIC BLOOD PRESSURE: 163 MMHG | DIASTOLIC BLOOD PRESSURE: 73 MMHG

## 2023-08-20 DIAGNOSIS — J44.1 COPD EXACERBATION (HCC): Primary | ICD-10-CM

## 2023-08-20 LAB
ALBUMIN SERPL-MCNC: 3.8 G/DL (ref 3.5–5)
ALBUMIN/GLOB SERPL: 1.2 (ref 1.1–2.2)
ALP SERPL-CCNC: 180 U/L (ref 45–117)
ALT SERPL-CCNC: 44 U/L (ref 12–78)
ANION GAP SERPL CALC-SCNC: 5 MMOL/L (ref 5–15)
AST SERPL W P-5'-P-CCNC: 25 U/L (ref 15–37)
BASOPHILS # BLD: 0 K/UL (ref 0–0.1)
BASOPHILS NFR BLD: 0 % (ref 0–1)
BILIRUB SERPL-MCNC: 0.7 MG/DL (ref 0.2–1)
BUN SERPL-MCNC: 12 MG/DL (ref 6–20)
BUN/CREAT SERPL: 9 (ref 12–20)
CA-I BLD-MCNC: 9.4 MG/DL (ref 8.5–10.1)
CHLORIDE SERPL-SCNC: 106 MMOL/L (ref 97–108)
CO2 SERPL-SCNC: 31 MMOL/L (ref 21–32)
CREAT SERPL-MCNC: 1.4 MG/DL (ref 0.55–1.02)
DIFFERENTIAL METHOD BLD: ABNORMAL
EKG ATRIAL RATE: 97 BPM
EKG DIAGNOSIS: NORMAL
EKG P AXIS: 59 DEGREES
EKG P-R INTERVAL: 162 MS
EKG Q-T INTERVAL: 334 MS
EKG QRS DURATION: 70 MS
EKG QTC CALCULATION (BAZETT): 424 MS
EKG R AXIS: 58 DEGREES
EKG T AXIS: 70 DEGREES
EKG VENTRICULAR RATE: 97 BPM
EOSINOPHIL # BLD: 0.3 K/UL (ref 0–0.4)
EOSINOPHIL NFR BLD: 3 % (ref 0–7)
ERYTHROCYTE [DISTWIDTH] IN BLOOD BY AUTOMATED COUNT: 13.7 % (ref 11.5–14.5)
GLOBULIN SER CALC-MCNC: 3.3 G/DL (ref 2–4)
GLUCOSE SERPL-MCNC: 118 MG/DL (ref 65–100)
HCT VFR BLD AUTO: 35.4 % (ref 35–47)
HGB BLD-MCNC: 11.1 G/DL (ref 11.5–16)
IMM GRANULOCYTES # BLD AUTO: 0.1 K/UL (ref 0–0.04)
IMM GRANULOCYTES NFR BLD AUTO: 1 % (ref 0–0.5)
LYMPHOCYTES # BLD: 0.6 K/UL (ref 0.8–3.5)
LYMPHOCYTES NFR BLD: 8 % (ref 12–49)
MAGNESIUM SERPL-MCNC: 2.1 MG/DL (ref 1.6–2.4)
MCH RBC QN AUTO: 29.4 PG (ref 26–34)
MCHC RBC AUTO-ENTMCNC: 31.4 G/DL (ref 30–36.5)
MCV RBC AUTO: 93.7 FL (ref 80–99)
MONOCYTES # BLD: 0.5 K/UL (ref 0–1)
MONOCYTES NFR BLD: 7 % (ref 5–13)
NEUTS SEG # BLD: 6.4 K/UL (ref 1.8–8)
NEUTS SEG NFR BLD: 81 % (ref 32–75)
NRBC # BLD: 0 K/UL (ref 0–0.01)
NRBC BLD-RTO: 0 PER 100 WBC
PLATELET # BLD AUTO: 122 K/UL (ref 150–400)
PMV BLD AUTO: 10.4 FL (ref 8.9–12.9)
POTASSIUM SERPL-SCNC: 3.7 MMOL/L (ref 3.5–5.1)
PROT SERPL-MCNC: 7.1 G/DL (ref 6.4–8.2)
RBC # BLD AUTO: 3.78 M/UL (ref 3.8–5.2)
SODIUM SERPL-SCNC: 142 MMOL/L (ref 136–145)
TROPONIN I SERPL HS-MCNC: 54 NG/L (ref 0–51)
TROPONIN I SERPL HS-MCNC: 60 NG/L (ref 0–51)
WBC # BLD AUTO: 7.9 K/UL (ref 3.6–11)

## 2023-08-20 PROCEDURE — 99285 EMERGENCY DEPT VISIT HI MDM: CPT

## 2023-08-20 PROCEDURE — 71045 X-RAY EXAM CHEST 1 VIEW: CPT

## 2023-08-20 PROCEDURE — 94640 AIRWAY INHALATION TREATMENT: CPT

## 2023-08-20 PROCEDURE — 84484 ASSAY OF TROPONIN QUANT: CPT

## 2023-08-20 PROCEDURE — 6360000002 HC RX W HCPCS: Performed by: EMERGENCY MEDICINE

## 2023-08-20 PROCEDURE — 36415 COLL VENOUS BLD VENIPUNCTURE: CPT

## 2023-08-20 PROCEDURE — 83735 ASSAY OF MAGNESIUM: CPT

## 2023-08-20 PROCEDURE — 93005 ELECTROCARDIOGRAM TRACING: CPT | Performed by: EMERGENCY MEDICINE

## 2023-08-20 PROCEDURE — 6370000000 HC RX 637 (ALT 250 FOR IP): Performed by: EMERGENCY MEDICINE

## 2023-08-20 PROCEDURE — 80053 COMPREHEN METABOLIC PANEL: CPT

## 2023-08-20 PROCEDURE — 85025 COMPLETE CBC W/AUTO DIFF WBC: CPT

## 2023-08-20 RX ORDER — PREDNISONE 20 MG/1
60 TABLET ORAL DAILY
Status: DISCONTINUED | OUTPATIENT
Start: 2023-08-20 | End: 2023-08-20

## 2023-08-20 RX ORDER — PREDNISONE 20 MG/1
60 TABLET ORAL ONCE
Status: COMPLETED | OUTPATIENT
Start: 2023-08-20 | End: 2023-08-20

## 2023-08-20 RX ORDER — PREDNISONE 20 MG/1
40 TABLET ORAL DAILY
Qty: 20 TABLET | Refills: 0 | Status: SHIPPED | OUTPATIENT
Start: 2023-08-20 | End: 2023-08-30

## 2023-08-20 RX ORDER — NAPROXEN 500 MG/1
500 TABLET ORAL ONCE
Status: COMPLETED | OUTPATIENT
Start: 2023-08-20 | End: 2023-08-20

## 2023-08-20 RX ORDER — OXYCODONE HYDROCHLORIDE 10 MG/1
30 TABLET ORAL ONCE
Status: COMPLETED | OUTPATIENT
Start: 2023-08-20 | End: 2023-08-20

## 2023-08-20 RX ADMIN — ALBUTEROL SULFATE 2.5 MG: 2.5 SOLUTION RESPIRATORY (INHALATION) at 04:28

## 2023-08-20 RX ADMIN — PREDNISONE 60 MG: 20 TABLET ORAL at 04:33

## 2023-08-20 RX ADMIN — NAPROXEN 500 MG: 500 TABLET ORAL at 06:19

## 2023-08-20 RX ADMIN — OXYCODONE HYDROCHLORIDE 30 MG: 10 TABLET ORAL at 05:28

## 2023-08-20 ASSESSMENT — PAIN SCALES - GENERAL
PAINLEVEL_OUTOF10: 10
PAINLEVEL_OUTOF10: 8
PAINLEVEL_OUTOF10: 10

## 2023-08-20 ASSESSMENT — PAIN - FUNCTIONAL ASSESSMENT: PAIN_FUNCTIONAL_ASSESSMENT: 0-10

## 2023-08-20 ASSESSMENT — LIFESTYLE VARIABLES
HOW OFTEN DO YOU HAVE A DRINK CONTAINING ALCOHOL: MONTHLY OR LESS
HOW MANY STANDARD DRINKS CONTAINING ALCOHOL DO YOU HAVE ON A TYPICAL DAY: 1 OR 2

## 2023-08-20 NOTE — ED TRIAGE NOTES
Pt CC SOB from home. Pt tried neb treatments at home without relief. Pt told EMS she felt bad all day and had a headache.

## 2023-08-20 NOTE — ED PROVIDER NOTES
Nursing notes    Vitals:    Vitals:    08/20/23 0345 08/20/23 0400 08/20/23 0430 08/20/23 0445   BP:    (!) 163/73   Pulse: 100 95 86 89   Resp: 22 13 20 21   Temp:       TempSrc:       SpO2: 92% 92% 97% 98%   Weight:       Height:            ED COURSE  ED Course as of 08/20/23 0619   Sun Aug 20, 2023   0616 Hartness of breath has resolved. She does have a mild headache and will treat with naproxen at this point. Will discharge patient on steroids as an outpatient. I discussed the importance and reasons why patient should come back to the emergency room. She does have a follow-up appoint with her cardiologist in 48 hours. [CS]      ED Course User Index  [CS] Ilana Villalpando MD       Disposition Considerations (Tests not done, Shared Decision Making, Pt Expectation of Test or Treatment.):  Patient's shortness of breath has resolved. Patient was given the following medications:  Medications   naproxen (NAPROSYN) tablet 500 mg (has no administration in time range)   predniSONE (DELTASONE) tablet 60 mg (60 mg Oral Given 8/20/23 0433)   albuterol (PROVENTIL) nebulizer solution 2.5 mg (2.5 mg Nebulization Given 8/20/23 0428)   oxyCODONE HCl (OXY-IR) immediate release tablet 30 mg (30 mg Oral Given 8/20/23 0528)       CONSULTS: (Who and What was discussed)  None     Social Determinants affecting Dx or Tx: None    Smoking Cessation: Not Applicable    PROCEDURES   Unless otherwise noted above, none. Procedures      CRITICAL CARE TIME   Patient does not meet Critical Care Time, 0 minutes    FINAL IMPRESSION     1. COPD exacerbation [facility])          DISPOSITION/PLAN   DISPOSITION Decision To Discharge 08/20/2023 06:17:25 AM    Discharge Note: The patient is stable for discharge home. The signs, symptoms, diagnosis, and discharge instructions have been discussed, understanding conveyed, and agreed upon. The patient is to follow up as recommended or return to ER should their symptoms worsen.       PATIENT REFERRED